# Patient Record
Sex: FEMALE | Race: WHITE | NOT HISPANIC OR LATINO | Employment: OTHER | URBAN - METROPOLITAN AREA
[De-identification: names, ages, dates, MRNs, and addresses within clinical notes are randomized per-mention and may not be internally consistent; named-entity substitution may affect disease eponyms.]

---

## 2017-03-06 ENCOUNTER — TRANSCRIBE ORDERS (OUTPATIENT)
Dept: LAB | Facility: CLINIC | Age: 57
End: 2017-03-06

## 2017-03-06 ENCOUNTER — OFFICE VISIT (OUTPATIENT)
Dept: LAB | Facility: CLINIC | Age: 57
End: 2017-03-06
Payer: COMMERCIAL

## 2017-03-06 ENCOUNTER — APPOINTMENT (OUTPATIENT)
Dept: PREADMISSION TESTING | Facility: HOSPITAL | Age: 57
End: 2017-03-06
Payer: COMMERCIAL

## 2017-03-06 ENCOUNTER — APPOINTMENT (OUTPATIENT)
Dept: LAB | Facility: CLINIC | Age: 57
End: 2017-03-06
Payer: COMMERCIAL

## 2017-03-06 VITALS
HEART RATE: 57 BPM | SYSTOLIC BLOOD PRESSURE: 157 MMHG | HEIGHT: 62 IN | WEIGHT: 160 LBS | TEMPERATURE: 98 F | DIASTOLIC BLOOD PRESSURE: 68 MMHG | RESPIRATION RATE: 16 BRPM | BODY MASS INDEX: 29.44 KG/M2

## 2017-03-06 DIAGNOSIS — C44.529 SQUAMOUS CELL CARCINOMA, TRUNK: ICD-10-CM

## 2017-03-06 DIAGNOSIS — C44.529 SQUAMOUS CELL CARCINOMA, TRUNK: Primary | ICD-10-CM

## 2017-03-06 LAB
ANION GAP SERPL CALCULATED.3IONS-SCNC: 7 MMOL/L (ref 4–13)
ATRIAL RATE: 47 BPM
BASOPHILS # BLD AUTO: 0.04 THOUSANDS/ΜL (ref 0–0.1)
BASOPHILS NFR BLD AUTO: 1 % (ref 0–1)
BUN SERPL-MCNC: 14 MG/DL (ref 5–25)
CALCIUM SERPL-MCNC: 8.8 MG/DL (ref 8.3–10.1)
CHLORIDE SERPL-SCNC: 106 MMOL/L (ref 100–108)
CO2 SERPL-SCNC: 30 MMOL/L (ref 21–32)
CREAT SERPL-MCNC: 0.95 MG/DL (ref 0.6–1.3)
EOSINOPHIL # BLD AUTO: 0.07 THOUSAND/ΜL (ref 0–0.61)
EOSINOPHIL NFR BLD AUTO: 1 % (ref 0–6)
ERYTHROCYTE [DISTWIDTH] IN BLOOD BY AUTOMATED COUNT: 13.4 % (ref 11.6–15.1)
GFR SERPL CREATININE-BSD FRML MDRD: >60 ML/MIN/1.73SQ M
GLUCOSE SERPL-MCNC: 93 MG/DL (ref 65–140)
HCT VFR BLD AUTO: 40.2 % (ref 34.8–46.1)
HGB BLD-MCNC: 13.3 G/DL (ref 11.5–15.4)
LYMPHOCYTES # BLD AUTO: 1.98 THOUSANDS/ΜL (ref 0.6–4.47)
LYMPHOCYTES NFR BLD AUTO: 40 % (ref 14–44)
MCH RBC QN AUTO: 26.8 PG (ref 26.8–34.3)
MCHC RBC AUTO-ENTMCNC: 33.1 G/DL (ref 31.4–37.4)
MCV RBC AUTO: 81 FL (ref 82–98)
MONOCYTES # BLD AUTO: 0.33 THOUSAND/ΜL (ref 0.17–1.22)
MONOCYTES NFR BLD AUTO: 7 % (ref 4–12)
NEUTROPHILS # BLD AUTO: 2.48 THOUSANDS/ΜL (ref 1.85–7.62)
NEUTS SEG NFR BLD AUTO: 51 % (ref 43–75)
P AXIS: 56 DEGREES
PLATELET # BLD AUTO: 214 THOUSANDS/UL (ref 149–390)
PMV BLD AUTO: 10.8 FL (ref 8.9–12.7)
POTASSIUM SERPL-SCNC: 3.7 MMOL/L (ref 3.5–5.3)
PR INTERVAL: 150 MS
QRS AXIS: 35 DEGREES
QRSD INTERVAL: 82 MS
QT INTERVAL: 446 MS
QTC INTERVAL: 394 MS
RBC # BLD AUTO: 4.96 MILLION/UL (ref 3.81–5.12)
SODIUM SERPL-SCNC: 143 MMOL/L (ref 136–145)
T WAVE AXIS: 30 DEGREES
VENTRICULAR RATE: 47 BPM
WBC # BLD AUTO: 4.9 THOUSAND/UL (ref 4.31–10.16)

## 2017-03-06 PROCEDURE — 85025 COMPLETE CBC W/AUTO DIFF WBC: CPT

## 2017-03-06 PROCEDURE — 36415 COLL VENOUS BLD VENIPUNCTURE: CPT

## 2017-03-06 PROCEDURE — 93005 ELECTROCARDIOGRAM TRACING: CPT

## 2017-03-06 PROCEDURE — 80048 BASIC METABOLIC PNL TOTAL CA: CPT

## 2017-03-06 RX ORDER — ESOMEPRAZOLE MAGNESIUM 40 MG/1
40 CAPSULE, DELAYED RELEASE ORAL
COMMUNITY
End: 2021-01-22

## 2017-03-22 RX ORDER — CETIRIZINE HYDROCHLORIDE 10 MG/1
10 TABLET ORAL DAILY PRN
COMMUNITY
End: 2022-03-18 | Stop reason: ALTCHOICE

## 2017-03-23 ENCOUNTER — ANESTHESIA (OUTPATIENT)
Dept: PERIOP | Facility: HOSPITAL | Age: 57
End: 2017-03-23
Payer: COMMERCIAL

## 2017-03-23 ENCOUNTER — ANESTHESIA EVENT (OUTPATIENT)
Dept: PERIOP | Facility: HOSPITAL | Age: 57
End: 2017-03-23
Payer: COMMERCIAL

## 2017-03-23 ENCOUNTER — HOSPITAL ENCOUNTER (OUTPATIENT)
Facility: HOSPITAL | Age: 57
Setting detail: OUTPATIENT SURGERY
Discharge: HOME | End: 2017-03-23
Attending: PLASTIC SURGERY | Admitting: PLASTIC SURGERY
Payer: COMMERCIAL

## 2017-03-23 VITALS
TEMPERATURE: 97.7 F | HEIGHT: 62 IN | SYSTOLIC BLOOD PRESSURE: 141 MMHG | WEIGHT: 155 LBS | DIASTOLIC BLOOD PRESSURE: 67 MMHG | RESPIRATION RATE: 18 BRPM | OXYGEN SATURATION: 97 % | BODY MASS INDEX: 28.52 KG/M2 | HEART RATE: 60 BPM

## 2017-03-23 DIAGNOSIS — C44.529 SQUAMOUS CELL CARCINOMA OF SKIN OF OTHER PART OF TRUNK: ICD-10-CM

## 2017-03-23 PROCEDURE — 88331 PATH CONSLTJ SURG 1 BLK 1SPC: CPT | Performed by: PLASTIC SURGERY

## 2017-03-23 PROCEDURE — 88305 TISSUE EXAM BY PATHOLOGIST: CPT | Performed by: PLASTIC SURGERY

## 2017-03-23 RX ORDER — SODIUM CHLORIDE, SODIUM LACTATE, POTASSIUM CHLORIDE, CALCIUM CHLORIDE 600; 310; 30; 20 MG/100ML; MG/100ML; MG/100ML; MG/100ML
INJECTION, SOLUTION INTRAVENOUS CONTINUOUS PRN
Status: DISCONTINUED | OUTPATIENT
Start: 2017-03-23 | End: 2017-03-23 | Stop reason: SURG

## 2017-03-23 RX ORDER — ONDANSETRON 2 MG/ML
4 INJECTION INTRAMUSCULAR; INTRAVENOUS ONCE AS NEEDED
Status: DISCONTINUED | OUTPATIENT
Start: 2017-03-23 | End: 2017-03-23 | Stop reason: HOSPADM

## 2017-03-23 RX ORDER — LIDOCAINE HYDROCHLORIDE AND EPINEPHRINE 10; 10 MG/ML; UG/ML
INJECTION, SOLUTION INFILTRATION; PERINEURAL AS NEEDED
Status: DISCONTINUED | OUTPATIENT
Start: 2017-03-23 | End: 2017-03-23 | Stop reason: HOSPADM

## 2017-03-23 RX ORDER — PROPOFOL 10 MG/ML
INJECTION, EMULSION INTRAVENOUS AS NEEDED
Status: DISCONTINUED | OUTPATIENT
Start: 2017-03-23 | End: 2017-03-23 | Stop reason: SURG

## 2017-03-23 RX ORDER — PROPOFOL 10 MG/ML
INJECTION, EMULSION INTRAVENOUS CONTINUOUS PRN
Status: DISCONTINUED | OUTPATIENT
Start: 2017-03-23 | End: 2017-03-23 | Stop reason: SURG

## 2017-03-23 RX ORDER — FENTANYL CITRATE/PF 50 MCG/ML
25 SYRINGE (ML) INJECTION
Status: DISCONTINUED | OUTPATIENT
Start: 2017-03-23 | End: 2017-03-23 | Stop reason: HOSPADM

## 2017-03-23 RX ORDER — ALBUTEROL SULFATE 2.5 MG/3ML
2.5 SOLUTION RESPIRATORY (INHALATION) ONCE AS NEEDED
Status: DISCONTINUED | OUTPATIENT
Start: 2017-03-23 | End: 2017-03-23 | Stop reason: HOSPADM

## 2017-03-23 RX ORDER — ONDANSETRON 2 MG/ML
4 INJECTION INTRAMUSCULAR; INTRAVENOUS EVERY 8 HOURS PRN
Status: DISCONTINUED | OUTPATIENT
Start: 2017-03-23 | End: 2017-03-23 | Stop reason: HOSPADM

## 2017-03-23 RX ORDER — MIDAZOLAM HYDROCHLORIDE 1 MG/ML
INJECTION INTRAMUSCULAR; INTRAVENOUS AS NEEDED
Status: DISCONTINUED | OUTPATIENT
Start: 2017-03-23 | End: 2017-03-23 | Stop reason: SURG

## 2017-03-23 RX ORDER — FENTANYL CITRATE 50 UG/ML
INJECTION, SOLUTION INTRAMUSCULAR; INTRAVENOUS AS NEEDED
Status: DISCONTINUED | OUTPATIENT
Start: 2017-03-23 | End: 2017-03-23 | Stop reason: SURG

## 2017-03-23 RX ORDER — ONDANSETRON 2 MG/ML
INJECTION INTRAMUSCULAR; INTRAVENOUS AS NEEDED
Status: DISCONTINUED | OUTPATIENT
Start: 2017-03-23 | End: 2017-03-23 | Stop reason: SURG

## 2017-03-23 RX ORDER — SODIUM CHLORIDE, SODIUM LACTATE, POTASSIUM CHLORIDE, CALCIUM CHLORIDE 600; 310; 30; 20 MG/100ML; MG/100ML; MG/100ML; MG/100ML
125 INJECTION, SOLUTION INTRAVENOUS CONTINUOUS
Status: CANCELLED | OUTPATIENT
Start: 2017-03-23

## 2017-03-23 RX ORDER — MEPERIDINE HYDROCHLORIDE 25 MG/ML
12.5 INJECTION INTRAMUSCULAR; INTRAVENOUS; SUBCUTANEOUS AS NEEDED
Status: DISCONTINUED | OUTPATIENT
Start: 2017-03-23 | End: 2017-03-23 | Stop reason: HOSPADM

## 2017-03-23 RX ORDER — ACETAMINOPHEN 325 MG/1
650 TABLET ORAL EVERY 6 HOURS PRN
Status: DISCONTINUED | OUTPATIENT
Start: 2017-03-23 | End: 2017-03-23 | Stop reason: HOSPADM

## 2017-03-23 RX ADMIN — PROPOFOL 80 MCG/KG/MIN: 10 INJECTION, EMULSION INTRAVENOUS at 15:06

## 2017-03-23 RX ADMIN — PROPOFOL 100 MG: 10 INJECTION, EMULSION INTRAVENOUS at 15:05

## 2017-03-23 RX ADMIN — ONDANSETRON 4 MG: 2 INJECTION INTRAMUSCULAR; INTRAVENOUS at 15:01

## 2017-03-23 RX ADMIN — MIDAZOLAM HYDROCHLORIDE 1 MG: 1 INJECTION, SOLUTION INTRAMUSCULAR; INTRAVENOUS at 15:05

## 2017-03-23 RX ADMIN — CEFAZOLIN SODIUM 1000 MG: 1 SOLUTION INTRAVENOUS at 15:00

## 2017-03-23 RX ADMIN — FENTANYL CITRATE 25 MCG: 50 INJECTION INTRAMUSCULAR; INTRAVENOUS at 15:39

## 2017-03-23 RX ADMIN — FENTANYL CITRATE 25 MCG: 50 INJECTION INTRAMUSCULAR; INTRAVENOUS at 15:01

## 2017-03-23 RX ADMIN — FENTANYL CITRATE 50 MCG: 50 INJECTION INTRAMUSCULAR; INTRAVENOUS at 15:05

## 2017-03-23 RX ADMIN — MIDAZOLAM HYDROCHLORIDE 1 MG: 1 INJECTION, SOLUTION INTRAMUSCULAR; INTRAVENOUS at 15:01

## 2017-03-23 RX ADMIN — SODIUM CHLORIDE, SODIUM LACTATE, POTASSIUM CHLORIDE, AND CALCIUM CHLORIDE: .6; .31; .03; .02 INJECTION, SOLUTION INTRAVENOUS at 14:56

## 2018-06-21 ENCOUNTER — OFFICE VISIT (OUTPATIENT)
Dept: UROLOGY | Facility: CLINIC | Age: 58
End: 2018-06-21
Payer: COMMERCIAL

## 2018-06-21 VITALS
HEIGHT: 62 IN | BODY MASS INDEX: 28.52 KG/M2 | HEART RATE: 53 BPM | SYSTOLIC BLOOD PRESSURE: 122 MMHG | DIASTOLIC BLOOD PRESSURE: 80 MMHG | WEIGHT: 155 LBS

## 2018-06-21 DIAGNOSIS — N39.41 URGE INCONTINENCE OF URINE: Primary | ICD-10-CM

## 2018-06-21 DIAGNOSIS — R35.0 URINARY FREQUENCY: Primary | ICD-10-CM

## 2018-06-21 LAB
POST-VOID RESIDUAL VOLUME, ML POC: 0 ML
SL AMB  POCT GLUCOSE, UA: ABNORMAL
SL AMB LEUKOCYTE ESTERASE,UA: ABNORMAL
SL AMB POCT BILIRUBIN,UA: ABNORMAL
SL AMB POCT BLOOD,UA: ABNORMAL
SL AMB POCT CLARITY,UA: ABNORMAL
SL AMB POCT COLOR,UA: YELLOW
SL AMB POCT KETONES,UA: ABNORMAL
SL AMB POCT NITRITE,UA: ABNORMAL
SL AMB POCT PH,UA: 5
SL AMB POCT SPECIFIC GRAVITY,UA: 1.02
SL AMB POCT URINE PROTEIN: ABNORMAL
SL AMB POCT UROBILINOGEN: ABNORMAL

## 2018-06-21 PROCEDURE — 51798 US URINE CAPACITY MEASURE: CPT | Performed by: UROLOGY

## 2018-06-21 PROCEDURE — 99244 OFF/OP CNSLTJ NEW/EST MOD 40: CPT | Performed by: UROLOGY

## 2018-06-21 PROCEDURE — 81002 URINALYSIS NONAUTO W/O SCOPE: CPT | Performed by: UROLOGY

## 2018-06-21 RX ORDER — OXYBUTYNIN CHLORIDE 5 MG/1
5 TABLET, EXTENDED RELEASE ORAL DAILY
Refills: 6 | COMMUNITY
Start: 2018-06-08 | End: 2019-04-30

## 2018-06-21 NOTE — PROGRESS NOTES
6/21/2018    Belva Cranker  1960  [id number]        Assessment  Urinary frequency and urgency    Plan  We discussed her symptoms  She has multiple symptoms of overactive bladder  We discussed behavior modification as first-line therapy  This apparently was not discussed with her at her previous urology office  Recommend significantly increase her water intake throughout the day and decreasing her tea  I suggest no longer drinking any tea, and drinking her usual cup of coffee in the morning as well as water the rest of the day  She may also resume the Myrbetriq if she likes  Typically this does not cause constipation although it is possible  She would like to try it, now that she understands had to treat the constipation if necessary  We will also check a follow-up ultrasound of the urinary tract including postvoid residual next month to evaluate for stability any effect of the treatment  She is comfortable with this plan  All questions answered to her satisfaction  History of Present Illness  Long Cousin is a 61 y o  female referred for evaluation of lower urinary tract symptoms  She complains of urinary frequency and urgency throughout the day  She also has nocturia 2 to 3 times per night  This is become very bothersome  She was treated by [facility] physician assistant for these symptoms  She has never seen a physician there  Initially she was treated with oxybutynin and did not notice any improvement  She was then treated with Myrbetriq and developed severe constipation requiring an emergency room visit  Eventually she was able to resolve, but discontinue the medication  She is now taking oxybutynin 5 mg without relief  She denies any additional workup for this  She believes that symptoms began with a urinary tract infection about 1 to 2 years ago  And review of urine studies I do not see any positive urine cultures  She reports normal gyn evaluations routinely    She has never had pelvic surgery  She is not a smoker  She had a normal sleep study to rule out sleep apnea  Typically, she drinks coffee in the morning and then decaffeinated tea throughout the day with about 1 to 2 bottles of water  At night she does not drink fluid  Bladder scan postvoid residual 0 mL in the office today  Review of Systems  Review of Systems   Constitutional: Negative  HENT: Negative  Respiratory: Negative  Cardiovascular: Negative  Gastrointestinal: Negative  Genitourinary:        As per HPI   Musculoskeletal: Negative  Skin: Negative  Neurological: Negative  Hematological: Negative  Past Medical History  Past Medical History:   Diagnosis Date    GERD (gastroesophageal reflux disease)     Melanoma ([facility] [address] )     Hx       Past Social History  Past Surgical History:   Procedure Laterality Date    CHEST WALL BIOPSY Right 3/23/2017    Procedure: BREAST SCC EXCISION; LOCAL FLAP; FROZEN SECTION ;  Surgeon: Shimon Cai MD;  [facility];  Service:     FOOT SURGERY Left     x 3       Past Family History  History reviewed  No pertinent family history  Past Social history  Social History     Social History    Marital status: /Civil Union     Spouse name: N/A    Number of children: N/A    Years of education: N/A     Occupational History    Not on file       Social History Main Topics    Smoking status: Never Smoker    Smokeless tobacco: Not on file    Alcohol use No    Drug use: No    Sexual activity: Not on file     Other Topics Concern    Not on file     Social History Narrative    No narrative on file     History   Smoking Status    Never Smoker   Smokeless Tobacco    Not on file       Current Medications  Current Outpatient Prescriptions   Medication Sig Dispense Refill    cetirizine (ZyrTEC) 10 mg tablet Take 10 mg by mouth daily      esomeprazole (NexIUM) 40 MG capsule Take 40 mg by mouth every morning before breakfast      oxybutynin (DITROPAN-XL) 5 mg 24 hr tablet Take 5 mg by mouth daily  6     No current facility-administered medications for this visit  Allergies  Allergies   Allergen Reactions    Codeine      "wired up"       Past Medical History, Social History, Family History, medications and allergies were reviewed  Vitals  Vitals:    06/21/18 1055   BP: 122/80   Pulse: (!) 53   Weight: 70 3 kg (155 lb)   Height: 5' 2" (1 575 m)       Physical Exam  Physical Exam   Constitutional: She is oriented to person, place, and time  She appears well-developed and well-nourished  Cardiovascular: Normal rate  Pulmonary/Chest: Effort normal    Abdominal: Soft  Genitourinary:   Genitourinary Comments: No CVA tenderness   Musculoskeletal: Normal range of motion  Neurological: She is alert and oriented to person, place, and time  Skin: Skin is warm, dry and intact  Psychiatric: She has a normal mood and affect  Vitals reviewed          Results  No results found for: PSA  Lab Results   Component Value Date    GLUCOSE 93 03/06/2017    CALCIUM 8 8 03/06/2017     03/06/2017    K 3 7 03/06/2017    CO2 30 03/06/2017     03/06/2017    BUN 14 03/06/2017    CREATININE 0 95 03/06/2017     Lab Results   Component Value Date    WBC 4 90 03/06/2017    HGB 13 3 03/06/2017    HCT 40 2 03/06/2017    MCV 81 (L) 03/06/2017     03/06/2017

## 2018-06-21 NOTE — LETTER
June 21, 2018     Suresh Love, [address] [address]    Patient: Miguelangel Calle   YOB: 1960   Date of Visit: 6/21/2018       Dear Dr Charley Benito: Thank you for referring Amanda Jiménez to me for evaluation  Below are my notes for this consultation  If you have questions, please do not hesitate to call me  I look forward to following your patient along with you  Sincerely,        Komal Whiting MD        CC: No Recipients  Komal Whiting MD  6/21/2018 11:41 AM  Sign at close encounter  6/21/2018    Miguelangel Calle  1960  [id number]        Assessment  Urinary frequency and urgency    Plan  We discussed her symptoms  She has multiple symptoms of overactive bladder  We discussed behavior modification as first-line therapy  This apparently was not discussed with her at her previous urology office  Recommend significantly increase her water intake throughout the day and decreasing her tea  I suggest no longer drinking any tea, and drinking her usual cup of coffee in the morning as well as water the rest of the day  She may also resume the Myrbetriq if she likes  Typically this does not cause constipation although it is possible  She would like to try it, now that she understands had to treat the constipation if necessary  We will also check a follow-up ultrasound of the urinary tract including postvoid residual next month to evaluate for stability any effect of the treatment  She is comfortable with this plan  All questions answered to her satisfaction  History of Present Illness  Epi Thomsa is a 61 y o  female referred for evaluation of lower urinary tract symptoms  She complains of urinary frequency and urgency throughout the day  She also has nocturia 2 to 3 times per night  This is become very bothersome  She was treated by [facility] physician assistant for these symptoms  She has never seen a physician there    Initially she was treated with oxybutynin and did not notice any improvement  She was then treated with Myrbetriq and developed severe constipation requiring an emergency room visit  Eventually she was able to resolve, but discontinue the medication  She is now taking oxybutynin 5 mg without relief  She denies any additional workup for this  She believes that symptoms began with a urinary tract infection about 1 to 2 years ago  And review of urine studies I do not see any positive urine cultures  She reports normal gyn evaluations routinely  She has never had pelvic surgery  She is not a smoker  She had a normal sleep study to rule out sleep apnea  Typically, she drinks coffee in the morning and then decaffeinated tea throughout the day with about 1 to 2 bottles of water  At night she does not drink fluid  Bladder scan postvoid residual 0 mL in the office today  Review of Systems  Review of Systems   Constitutional: Negative  HENT: Negative  Respiratory: Negative  Cardiovascular: Negative  Gastrointestinal: Negative  Genitourinary:        As per HPI   Musculoskeletal: Negative  Skin: Negative  Neurological: Negative  Hematological: Negative  Past Medical History  Past Medical History:   Diagnosis Date    GERD (gastroesophageal reflux disease)     Melanoma ([facility] [address] )     Hx       Past Social History  Past Surgical History:   Procedure Laterality Date    CHEST WALL BIOPSY Right 3/23/2017    Procedure: BREAST SCC EXCISION; LOCAL FLAP; FROZEN SECTION ;  Surgeon: Shimon Cai MD;  [facility];  Service:     FOOT SURGERY Left     x 3       Past Family History  History reviewed  No pertinent family history  Past Social history  Social History     Social History    Marital status: /Civil Union     Spouse name: N/A    Number of children: N/A    Years of education: N/A     Occupational History    Not on file       Social History Main Topics    Smoking status: Never Smoker  Smokeless tobacco: Not on file    Alcohol use No    Drug use: No    Sexual activity: Not on file     Other Topics Concern    Not on file     Social History Narrative    No narrative on file     History   Smoking Status    Never Smoker   Smokeless Tobacco    Not on file       Current Medications  Current Outpatient Prescriptions   Medication Sig Dispense Refill    cetirizine (ZyrTEC) 10 mg tablet Take 10 mg by mouth daily      esomeprazole (NexIUM) 40 MG capsule Take 40 mg by mouth every morning before breakfast      oxybutynin (DITROPAN-XL) 5 mg 24 hr tablet Take 5 mg by mouth daily  6     No current facility-administered medications for this visit  Allergies  Allergies   Allergen Reactions    Codeine      "wired up"       Past Medical History, Social History, Family History, medications and allergies were reviewed  Vitals  Vitals:    06/21/18 1055   BP: 122/80   Pulse: (!) 53   Weight: 70 3 kg (155 lb)   Height: 5' 2" (1 575 m)       Physical Exam  Physical Exam   Constitutional: She is oriented to person, place, and time  She appears well-developed and well-nourished  Cardiovascular: Normal rate  Pulmonary/Chest: Effort normal    Abdominal: Soft  Genitourinary:   Genitourinary Comments: No CVA tenderness   Musculoskeletal: Normal range of motion  Neurological: She is alert and oriented to person, place, and time  Skin: Skin is warm, dry and intact  Psychiatric: She has a normal mood and affect  Vitals reviewed          Results  No results found for: PSA  Lab Results   Component Value Date    GLUCOSE 93 03/06/2017    CALCIUM 8 8 03/06/2017     03/06/2017    K 3 7 03/06/2017    CO2 30 03/06/2017     03/06/2017    BUN 14 03/06/2017    CREATININE 0 95 03/06/2017     Lab Results   Component Value Date    WBC 4 90 03/06/2017    HGB 13 3 03/06/2017    HCT 40 2 03/06/2017    MCV 81 (L) 03/06/2017     03/06/2017

## 2018-06-21 NOTE — TELEPHONE ENCOUNTER
Patient seen as consult today with Dr Paul London in [address] [address] office  Per patient, Dr Paul London, wanted her to go back on Myrbetriq  Patient reports her previous urologist had her on 25 mg Myrbetriq, but had taken her off and placed her on Oxybutynin  Patient reports she went today to get Myrbetriq script refilled and was told that previous script   Patient requesting script for Myrbetriq 25 mg/90 day be sent to [facility]  Patient reports previously, she needed to get authorization for Myrbetriq to be covered  Instructed patient that script will be sent, if authorization needed, she should call our office

## 2018-06-21 NOTE — TELEPHONE ENCOUNTER
Patient called office  Reports when she got home, she looked an on old prescription bottle, discovered she has been taking 50 mg Myrbetriq  Script changed to 50 mg Myrbetriq  To call pharmacy with change

## 2018-06-27 DIAGNOSIS — N39.41 URGE INCONTINENCE OF URINE: ICD-10-CM

## 2018-06-28 ENCOUNTER — HOSPITAL ENCOUNTER (OUTPATIENT)
Dept: ULTRASOUND IMAGING | Facility: MEDICAL CENTER | Age: 58
Discharge: HOME | End: 2018-06-28
Payer: COMMERCIAL

## 2018-06-28 DIAGNOSIS — R35.0 URINARY FREQUENCY: ICD-10-CM

## 2018-06-28 PROCEDURE — 51798 US URINE CAPACITY MEASURE: CPT

## 2018-07-25 NOTE — PROGRESS NOTES
7/26/2018      Chief Complaint   Patient presents with    Urinary Frequency       Assessment and Plan    61 y o  female managed by Dr Susan Harden    1  OAB  - doing well on Myrbetriq, will continue this and behavioral modification and FU in 1 year  - provided the patient a handout on OAB from the [facility] patient education handout for her to review      History of Present Illness  Carlie Small is a 61 y o  female here for follow up evaluation of OAB  Failed oxybutynin and previously while on Myrbetriq had a SBO so she was discontinued on this  Has had no issues with constipation since restarting Myrbetriq  Is doing well with improvement in her OAB symptoms  They are listed as below  U/S obtained prior to visit is within normal limits  Review of Systems   Constitutional: Negative for activity change, chills and fever  Gastrointestinal: Negative for abdominal distention and abdominal pain  Musculoskeletal: Negative for back pain and gait problem  Psychiatric/Behavioral: Negative for behavioral problems and confusion  Urinary Incontinence Screening      Most Recent Value   Urinary Incontinence   Urinary Incontinence? No   Incomplete emptying? No   Urinary frequency? No   Urinary urgency? No   Urinary hesitancy? No   Dysuria (painful difficult urination)? No   Nocturia (waking up to use the bathroom)? Yes [1-2 times per night]   Straining (having to push to go)? No   Weak stream?  No   Intermittent stream?  No   Post void dribbling?   No          Past Medical History  Past Medical History:   Diagnosis Date    GERD (gastroesophageal reflux disease)     Melanoma ([facility] [address] )     Hx       Past Social History  Past Surgical History:   Procedure Laterality Date    CHEST WALL BIOPSY Right 3/23/2017    Procedure: BREAST SCC EXCISION; LOCAL FLAP; FROZEN SECTION ;  Surgeon: Shawna Anthony MD;  [facility];  Service:     FOOT SURGERY Left     x 3     History   Smoking Status    Never Smoker   Smokeless Tobacco    Not on file       Past Family History  History reviewed  No pertinent family history  Past Social history  Social History     Social History    Marital status: /Civil Union     Spouse name: N/A    Number of children: N/A    Years of education: N/A     Occupational History    Not on file  Social History Main Topics    Smoking status: Never Smoker    Smokeless tobacco: Not on file    Alcohol use No    Drug use: No    Sexual activity: Not on file     Other Topics Concern    Not on file     Social History Narrative    No narrative on file       Current Medications  Current Outpatient Prescriptions   Medication Sig Dispense Refill    cetirizine (ZyrTEC) 10 mg tablet Take 10 mg by mouth daily      esomeprazole (NexIUM) 40 MG capsule Take 40 mg by mouth every morning before breakfast      Mirabegron ER (MYRBETRIQ) 50 MG TB24 Take 1 tablet (50 mg total) by mouth daily 90 tablet 3    oxybutynin (DITROPAN-XL) 5 mg 24 hr tablet Take 5 mg by mouth daily  6     No current facility-administered medications for this visit  Allergies  Allergies   Allergen Reactions    Codeine      "wired up"         The following portions of the patient's history were reviewed and updated as appropriate: allergies, current medications, past medical history, past social history, past surgical history and problem list       Vitals  Vitals:    07/26/18 1124   BP: 124/80   Weight: 73 kg (161 lb)   Height: 5' 2" (1 575 m)       Physical Exam  Constitutional   General appearance: Patient is seated and in no acute distress, well appearing and well nourished  Head and Face   Head and face: Normal     Eyes   Conjunctiva and lids: No erythema, swelling or discharge  Ears, Nose, Mouth, and Throat   Hearing: Normal     Pulmonary   Respiratory effort: No increased work of breathing or signs of respiratory distress      Cardiovascular   Examination of extremities for edema and/or varicosities: Normal  Abdomen   Abdomen: Non-tender, no masses  Musculoskeletal   Gait and station: Normal     Skin   Skin and subcutaneous tissue: Warm, dry, and intact  No visible lesions or rashes  Psychiatric   Judgment and insight: Normal  Recent and remote memory:  Normal  Mood and affect: Normal      Results  No results found for this or any previous visit (from the past 1 hour(s))  ]  No results found for: PSA  Lab Results   Component Value Date    GLUCOSE 93 03/06/2017    CALCIUM 8 8 03/06/2017     03/06/2017    K 3 7 03/06/2017    CO2 30 03/06/2017     03/06/2017    BUN 14 03/06/2017    CREATININE 0 95 03/06/2017     Lab Results   Component Value Date    WBC 4 90 03/06/2017    HGB 13 3 03/06/2017    HCT 40 2 03/06/2017    MCV 81 (L) 03/06/2017     03/06/2017       Orders  No orders of the defined types were placed in this encounter

## 2018-07-26 ENCOUNTER — OFFICE VISIT (OUTPATIENT)
Dept: UROLOGY | Facility: CLINIC | Age: 58
End: 2018-07-26
Payer: COMMERCIAL

## 2018-07-26 VITALS
DIASTOLIC BLOOD PRESSURE: 80 MMHG | SYSTOLIC BLOOD PRESSURE: 124 MMHG | HEIGHT: 62 IN | WEIGHT: 161 LBS | BODY MASS INDEX: 29.63 KG/M2

## 2018-07-26 DIAGNOSIS — R35.0 URINARY FREQUENCY: Primary | ICD-10-CM

## 2018-07-26 PROCEDURE — 99213 OFFICE O/P EST LOW 20 MIN: CPT | Performed by: PHYSICIAN ASSISTANT

## 2018-09-06 ENCOUNTER — OFFICE VISIT (OUTPATIENT)
Dept: OBGYN CLINIC | Facility: MEDICAL CENTER | Age: 58
End: 2018-09-06
Payer: COMMERCIAL

## 2018-09-06 ENCOUNTER — APPOINTMENT (OUTPATIENT)
Dept: RADIOLOGY | Facility: CLINIC | Age: 58
End: 2018-09-06
Payer: COMMERCIAL

## 2018-09-06 VITALS
HEIGHT: 62 IN | DIASTOLIC BLOOD PRESSURE: 84 MMHG | HEART RATE: 60 BPM | BODY MASS INDEX: 29.85 KG/M2 | SYSTOLIC BLOOD PRESSURE: 130 MMHG | WEIGHT: 162.2 LBS

## 2018-09-06 DIAGNOSIS — M25.551 PAIN IN RIGHT HIP: Primary | ICD-10-CM

## 2018-09-06 DIAGNOSIS — M25.551 PAIN IN RIGHT HIP: ICD-10-CM

## 2018-09-06 PROCEDURE — 99203 OFFICE O/P NEW LOW 30 MIN: CPT | Performed by: ORTHOPAEDIC SURGERY

## 2018-09-06 PROCEDURE — 73502 X-RAY EXAM HIP UNI 2-3 VIEWS: CPT

## 2018-09-06 NOTE — PROGRESS NOTES
Assessment/Plan     1  Pain in right hip      Orders Placed This Encounter   Procedures    XR hip/pelv 2-3 vws right if performed    MRI hip right wo contrast     Ms  Shira Patel has persistent right hip pain  She has a history of melanoma  We will obtain an MRI  If there is anything suspicious we will obtain an MRI with IV contrast   She did not want an MRI arthrogram   She did not want an MRI with IV contrast at this time  OTC pain medication as needed    Return for Follow-up after MRI completed  I answered all of the patient's questions during the visit and provided education of the patient's condition during the visit  The patient verbalized understanding of the information given and agrees with the plan  This note was dictated using [employer] software  It may contain errors including improperly dictated words  Please contact physician directly for any questions  History of Present Illness   Chief complaint:   Chief Complaint   Patient presents with    Right Hip - Pain       HPI: Montse Dwyer is a 61 y o  female that c/o right hip pain  She has had pain for the past 4-5 months  Most her pain is over the anterolateral aspect of her right hip  Is constant  It worsens with going from a sitting to standing position  She describes as sharp with movement  She is unable to sleep on her right side  She denies any falls or trauma  She denies radiation of her pain  She notes that she had a previous episode of similar pain about 1 2 years ago  She saw an orthopedic surgeon who gave her medication  She is unsure how much this helped  She takes Advil when she needs it which gives her minimal relief  She has done physical therapy  She has not had injections and has not had surgery  ROS:    See HPI for musculoskeletal review     All other systems reviewed are negative     Historical Information   Past Medical History:   Diagnosis Date    GERD (gastroesophageal reflux disease)     Melanoma ([facility] [address] ) Hx     Past Surgical History:   Procedure Laterality Date    CHEST WALL BIOPSY Right 3/23/2017    Procedure: BREAST SCC EXCISION; LOCAL FLAP; FROZEN SECTION ;  Surgeon: Remi Sandoval MD;  [facility];  Service:     FOOT SURGERY Left     x 3     Social History   History   Alcohol Use No     History   Drug Use No     History   Smoking Status    Never Smoker   Smokeless Tobacco    Never Used     Family History: No family history on file  Meds/Allergies     (Not in a hospital admission)  Allergies   Allergen Reactions    Codeine      "wired up"       Objective   Vitals: Blood pressure 130/84, pulse 60, height 5' 2" (1 575 m), weight 73 6 kg (162 lb 3 2 oz)  ,Body mass index is 29 67 kg/m²  PE:  AAOx 3  WDWN  Hearing intact, no drainage from eyes  Regular rate  no audible wheezing  no abdominal distension  LE compartments soft, skin intact    right hip:   No dislocation/deformity  ROM: full  mild TTP over greater trochanter  No TTP over SIJ  + Madi test  Neg   Ron's test  Abduction 5/5  AT/GS intact    Back:    No TTP over lumbar spinous processes, paraspinal musculature  Negative SLR    Imaging Studies: I have personally reviewed pertinent films in PACS   X-ray right hip:  No acute osseous abnormality

## 2018-09-19 ENCOUNTER — HOSPITAL ENCOUNTER (OUTPATIENT)
Dept: MRI IMAGING | Facility: HOSPITAL | Age: 58
Discharge: HOME | End: 2018-09-19
Attending: ORTHOPAEDIC SURGERY
Payer: COMMERCIAL

## 2018-09-19 DIAGNOSIS — M25.551 PAIN IN RIGHT HIP: ICD-10-CM

## 2018-09-19 PROCEDURE — 73721 MRI JNT OF LWR EXTRE W/O DYE: CPT

## 2018-09-25 ENCOUNTER — OFFICE VISIT (OUTPATIENT)
Dept: OBGYN CLINIC | Facility: MEDICAL CENTER | Age: 58
End: 2018-09-25
Payer: COMMERCIAL

## 2018-09-25 VITALS
HEART RATE: 70 BPM | BODY MASS INDEX: 29.74 KG/M2 | SYSTOLIC BLOOD PRESSURE: 119 MMHG | DIASTOLIC BLOOD PRESSURE: 77 MMHG | WEIGHT: 162.6 LBS

## 2018-09-25 DIAGNOSIS — M76.891 HAMSTRING TENDINITIS OF RIGHT THIGH: ICD-10-CM

## 2018-09-25 DIAGNOSIS — M70.61 GREATER TROCHANTERIC BURSITIS OF RIGHT HIP: Primary | ICD-10-CM

## 2018-09-25 PROCEDURE — 99213 OFFICE O/P EST LOW 20 MIN: CPT | Performed by: ORTHOPAEDIC SURGERY

## 2018-09-25 NOTE — PROGRESS NOTES
Assessment/Plan     1  Greater trochanteric bursitis of right hip    2  Hamstring tendinitis of right thigh      Orders Placed This Encounter   Procedures    Ambulatory referral to Physical Therapy     -will go to one session of PT then transition to home exercises  -continue with OTC NSAIDs and tylenol    Return 4-6 weeks   Subjective   Chief Complaint:   Chief Complaint   Patient presents with    Right Hip - Follow-up       Montse Dwyer is a 60 y o  female who presents for follow up for right hip pain after MRI  She states she is still having pain in her lateral hip  The pain does not radiate  The pain is there all the time  She cannot lay on that side at night  She has been taking Advil and Tylenol as needed for pain control  The Advil does help somewhat  She has not done any physical therapy  She has not had any steroid injections  She has had no surgeries on the hip  Review of Systems  See HPI for musculoskeletal review  All other systems reviewed are negative     History:  Past Medical History:   Diagnosis Date    GERD (gastroesophageal reflux disease)     [facility] ([facility] [address] )     Hx     Past Surgical History:   Procedure Laterality Date    CHEST WALL BIOPSY Right 3/23/2017    Procedure: BREAST SCC EXCISION; LOCAL FLAP; FROZEN SECTION ;  Surgeon: Swapna Sanchez MD;  [facility];  Service:     FOOT SURGERY Left     x 3     Social History   History   Alcohol Use No     History   Drug Use No     History   Smoking Status    Never Smoker   Smokeless Tobacco    Never Used     Family History: History reviewed  No pertinent family history      Meds/Allergies     (Not in a hospital admission)  Allergies   Allergen Reactions    Codeine      "wired up"    Prochlorperazine Other (See Comments)     Unknown to patient          Objective     /77   Pulse 70   Wt 73 8 kg (162 lb 9 6 oz)   BMI 29 74 kg/m²      PE:  AAOx 3  WDWN  Hearing intact, no drainage from eyes  no audible wheezing  no abdominal distension  LE compartments soft, skin intact    Ortho Exam:  right hip:   No dislocation/deformity  ROM: full  No TTP over greater trochanter  No TTP over SIJ  Neg  Madi test  Neg  Ron's test  Abduction 5/5  AT/GS intact    Imaging Studies: I have personally reviewed pertinent films in PACS  MRI- no lesions, mild bilateral trochanteric bursitis, mild hamstring tendinosis

## 2018-10-04 ENCOUNTER — HOSPITAL ENCOUNTER (EMERGENCY)
Facility: HOSPITAL | Age: 58
Discharge: HOME | End: 2018-10-04
Attending: EMERGENCY MEDICINE
Payer: COMMERCIAL

## 2018-10-04 VITALS
RESPIRATION RATE: 16 BRPM | SYSTOLIC BLOOD PRESSURE: 193 MMHG | BODY MASS INDEX: 29.76 KG/M2 | HEART RATE: 95 BPM | WEIGHT: 162.7 LBS | OXYGEN SATURATION: 96 % | DIASTOLIC BLOOD PRESSURE: 87 MMHG | TEMPERATURE: 98.9 F

## 2018-10-04 DIAGNOSIS — K59.00 CONSTIPATION: Primary | ICD-10-CM

## 2018-10-04 PROCEDURE — 99283 EMERGENCY DEPT VISIT LOW MDM: CPT

## 2018-10-04 RX ORDER — DOCUSATE SODIUM 100 MG/1
100 CAPSULE, LIQUID FILLED ORAL EVERY 12 HOURS
Qty: 60 CAPSULE | Refills: 0 | Status: SHIPPED | OUTPATIENT
Start: 2018-10-04 | End: 2022-03-18 | Stop reason: ALTCHOICE

## 2018-10-04 RX ORDER — LORAZEPAM 1 MG/1
1 TABLET ORAL EVERY 8 HOURS PRN
COMMUNITY
Start: 2018-09-24

## 2018-10-04 NOTE — ED PROVIDER NOTES
History  Chief Complaint   Patient presents with    Fecal Impaction     pt c/o constipation for a week  pt has used otc meds no bm  called pcp and kub done which showed an impaction  pcp reffered patient to ED  pt c/o nausea, and abd pain pt did enema this am       34-year-old female presents for evaluation of constipation  Patient states she last normally moved her bowels on Sunday  She states that since then she has had several small loose bowel movements but not initiate rate is normal  She states she has tried an enema, laxatives without relief  Patient has a history of similar symptoms in the past   Patient had Abdominal obstruction series reading from earlier today was reviewed  It rates moderate amount of feces in the colon rectum  There is no findings/obstructive  Patient denies abdominal pain, back/flank pain, rectal pain, anorexia, nausea vomiting fevers, chills  History provided by:  Patient      Prior to Admission Medications   Prescriptions Last Dose Informant Patient Reported? Taking? LORazepam (ATIVAN) 1 mg tablet   Yes Yes   Sig: Take 1 mg by mouth every 8 (eight) hours   Mirabegron ER (MYRBETRIQ) 50 MG TB24   No No   Sig: Take 1 tablet (50 mg total) by mouth daily   cetirizine (ZyrTEC) 10 mg tablet   Yes No   Sig: Take 10 mg by mouth daily   esomeprazole (NexIUM) 40 MG capsule   Yes No   Sig: Take 40 mg by mouth every morning before breakfast   oxybutynin (DITROPAN-XL) 5 mg 24 hr tablet   Yes No   Sig: Take 5 mg by mouth daily      Facility-Administered Medications: None       Past Medical History:   Diagnosis Date    GERD (gastroesophageal reflux disease)     Melanoma ([address] )     Hx       Past Surgical History:   Procedure Laterality Date    CHEST WALL BIOPSY Right 3/23/2017    Procedure: BREAST SCC EXCISION; LOCAL FLAP; FROZEN SECTION ;  Surgeon: Christ Zarate MD;  [facility];  Service:     FOOT SURGERY Left     x 3       History reviewed  No pertinent family history    I have reviewed and agree with the history as documented  Social History   Substance Use Topics    Smoking status: Never Smoker    Smokeless tobacco: Never Used    Alcohol use No        Review of Systems   Constitutional: Negative for activity change, appetite change, fatigue and fever  HENT: Negative for congestion, dental problem, ear pain, rhinorrhea and sore throat  Eyes: Negative for pain and redness  Respiratory: Negative for chest tightness, shortness of breath and wheezing  Cardiovascular: Negative for chest pain and palpitations  Gastrointestinal: Positive for constipation  Negative for abdominal distention, abdominal pain, blood in stool, diarrhea, nausea, rectal pain and vomiting  Endocrine: Negative for cold intolerance and heat intolerance  Genitourinary: Negative for dysuria, frequency, hematuria, vaginal bleeding and vaginal discharge  Musculoskeletal: Negative for arthralgias and myalgias  Skin: Negative for color change, pallor and rash  Neurological: Negative for weakness and numbness  Hematological: Does not bruise/bleed easily  Psychiatric/Behavioral: Negative for agitation, hallucinations and suicidal ideas  Physical Exam  Physical Exam   Constitutional: She is oriented to person, place, and time  She appears well-developed and well-nourished  HENT:   Mouth/Throat: No oropharyngeal exudate  TMs normal bilaterally no pharyngeal erythema no rhinorrhea nontender palpation of sinuses, normal looking turbinates   Eyes: Conjunctivae and EOM are normal    Neck: Normal range of motion  Neck supple  No meningeal signs   Cardiovascular: Normal rate, regular rhythm, normal heart sounds and intact distal pulses  Pulmonary/Chest: Effort normal and breath sounds normal  No respiratory distress  She has no wheezes  She has no rales  She exhibits no tenderness  Abdominal: Soft  Bowel sounds are normal  She exhibits no distension and no mass  There is no tenderness  No hernia  No cvat   Musculoskeletal: Normal range of motion  She exhibits no edema  Lymphadenopathy:     She has no cervical adenopathy  Neurological: She is alert and oriented to person, place, and time  No cranial nerve deficit  Skin: No rash noted  No erythema  No edema   Psychiatric: She has a normal mood and affect  Her behavior is normal    Nursing note and vitals reviewed  Vital Signs  ED Triage Vitals [10/04/18 1142]   Temperature Pulse Respirations Blood Pressure SpO2   98 9 °F (37 2 °C) 95 16 (!) 193/87 96 %      Temp Source Heart Rate Source Patient Position - Orthostatic VS BP Location FiO2 (%)   Oral Monitor Sitting Right arm --      Pain Score       No Pain           Vitals:    10/04/18 1142   BP: (!) 193/87   Pulse: 95   Patient Position - Orthostatic VS: Sitting       Visual Acuity      ED Medications  Medications - No data to display    Diagnostic Studies  Results Reviewed     None                 No orders to display              Procedures  Procedures       Phone Contacts  ED Phone Contact    ED Course  ED Course as of Oct 04 1423   Thu Oct 04, 2018   1412 Patient feels relieved after successful bowel movement  Patient will be discharged home on stool softeners, PCP follow-up  MDM  Number of Diagnoses or Management Options  Diagnosis management comments: Constipation-will do enema, reassess    CritCare Time    Disposition  Final diagnoses:   Constipation     Time reflects when diagnosis was documented in both MDM as applicable and the Disposition within this note     Time User Action Codes Description Comment    10/4/2018  2:21 PM Ansley Mejía Add [K59 00] Constipation       ED Disposition     ED Disposition Condition Comment    Discharge  Venkata Sandy discharge to home/self care      Condition at discharge: Good        Follow-up Information     Follow up With Specialties Details Why Contact Info    Monica Cruz MD Family Medicine Schedule an appointment as soon as possible for a visit in 2 days  [address] [place] [address]  [phone]            Patient's Medications   Discharge Prescriptions    DOCUSATE SODIUM (COLACE) 100 MG CAPSULE    Take 1 capsule (100 mg total) by mouth every 12 (twelve) hours       Start Date: 10/4/2018 End Date: --       Order Dose: 100 mg       Quantity: 60 capsule    Refills: 0     No discharge procedures on file      ED Provider  Electronically Signed by           Shelbie Bhatti MD  10/04/18 0080

## 2018-10-04 NOTE — DISCHARGE INSTRUCTIONS
Constipation   WHAT YOU NEED TO KNOW:   Constipation is when you have hard, dry bowel movements, or you go longer than usual between bowel movements  DISCHARGE INSTRUCTIONS:   Seek care immediately if:   · You have blood in your bowel movements  · You have a fever and abdominal pain with the constipation  Contact your healthcare provider if:   · Your constipation gets worse  · You start to vomit  · You have questions or concerns about your condition or care  Medicines:   · Medicine or a fiber supplement  may help make your bowel movement softer  A laxative may help relax and loosen your intestines to help you have a bowel movement  You may also be given medicine to increase fluid in your intestines  The fluid may help move bowel movements through your intestines  · Take your medicine as directed  Contact your healthcare provider if you think your medicine is not helping or if you have side effects  Tell him of her if you are allergic to any medicine  Keep a list of the medicines, vitamins, and herbs you take  Include the amounts, and when and why you take them  Bring the list or the pill bottles to follow-up visits  Carry your medicine list with you in case of an emergency  Manage your constipation:   · Drink liquids as directed  You may need to drink extra liquids to help soften and move your bowels  Ask how much liquid to drink each day and which liquids are best for you  · Eat high-fiber foods  This may help decrease constipation by adding bulk to your bowel movements  High-fiber foods include fruit, vegetables, whole-grain breads and cereals, and beans  Your healthcare provider or dietitian can help you create a high-fiber meal plan  · Exercise regularly  Regular physical activity can help stimulate your intestines  Ask which exercises are best for you  · Schedule a time each day to have a bowel movement  This may help train your body to have regular bowel movements   [facility] forward while you are on the toilet to help move the bowel movement out  Sit on the toilet for at least 10 minutes, even if you do not have a bowel movement  Follow up with your healthcare provider as directed:  Write down your questions so you remember to ask them during your visits  © 2017 [facility] Information is for End User's use only and may not be sold, redistributed or otherwise used for commercial purposes  All illustrations and images included in CareNotes® are the copyrighted property of [employer]  or Cosmo Alvarado  The above information is an  only  It is not intended as medical advice for individual conditions or treatments  Talk to your doctor, nurse or pharmacist before following any medical regimen to see if it is safe and effective for you

## 2018-10-17 ENCOUNTER — TELEPHONE (OUTPATIENT)
Dept: UROLOGY | Facility: CLINIC | Age: 58
End: 2018-10-17

## 2018-10-17 NOTE — TELEPHONE ENCOUNTER
Patient managed by Dr Alejandra Medina, seen in [facility] End office  Patient last seen in July 2018 by Guera Hudson PA-C  Patient currently taking Myrbetriq for OAB  Per patient, symptoms are not improved  Patient reports "accupuncture treatment" was briefly mentioned at her last appointment  Instructed patient, she might be referring to PTNS treatments  Briefly explained treatments to patient  Advised patient, she should schedule follow up to discuss PTNS and also any other treatments that she may be a candidate for to treat her symptoms  Patient agreed  Patient scheduled for 10/25/18 at 1:30 in the [facility] office with Maciej Garza

## 2018-12-05 NOTE — PROGRESS NOTES
12/7/2018      Chief Complaint   Patient presents with    Urinary Frequency       Assessment and Plan    60 y o  female managed by Dr Jarrett Fernandez    1  OAB  - given her constiaption anticholenergics not recommended  - discussed tertiary treatment options with the patient including pelvic floor physical therapy, InterStim, PTNS, and Botox injections  - patient is most interested in neuromodulation specifically PTNS, provided her information for further review but will schedule this in the meantime       History of Present Illness  Erasmo Rasheed is a 60 y o  female here for follow up evaluation of her overactive bladder  Was previously doing well on Myrbetriq but states that this has caused her significant constipation and she recently had a bowel obstruction  Her lower urinary tract symptoms are listed as below  Most bothersome is her frequency and nocturia  She is urinating about once every hour and wakes up 3 times at night to go  She has tried behavioral modification with no success  Review of Systems   Constitutional: Negative for activity change, chills and fever  Gastrointestinal: Negative for abdominal distention and abdominal pain  Musculoskeletal: Negative for back pain and gait problem  Psychiatric/Behavioral: Negative for behavioral problems and confusion  Urinary Incontinence Screening      Most Recent Value   Urinary Incontinence   Urinary Incontinence? No   Incomplete emptying? No   Urinary frequency? Yes   Urinary urgency? No   Urinary hesitancy? No   Dysuria (painful difficult urination)? No   Nocturia (waking up to use the bathroom)? Yes [3x/night]   Straining (having to push to go)?   No          Past Medical History  Past Medical History:   Diagnosis Date    GERD (gastroesophageal reflux disease)     [address]oma ([facility] [address] )     Hx       Past Social History  Past Surgical History:   Procedure Laterality Date    CHEST WALL BIOPSY Right 3/23/2017    Procedure: BREAST SCC EXCISION; LOCAL FLAP; FROZEN SECTION ;  Surgeon: Chris Da Silva MD;  [facility];  Service:     FOOT SURGERY Left     x 3     History   Smoking Status    Never Smoker   Smokeless Tobacco    Never Used       Past Family History  No family history on file  Past Social history  Social History     Social History    Marital status: /Civil Union     Spouse name: N/A    Number of children: N/A    Years of education: N/A     Occupational History    Not on file  Social History Main Topics    Smoking status: Never Smoker    Smokeless tobacco: Never Used    Alcohol use No    Drug use: No    Sexual activity: Not on file     Other Topics Concern    Not on file     Social History Narrative    No narrative on file       Current Medications  Current Outpatient Prescriptions   Medication Sig Dispense Refill    clindamycin (CLEOCIN T) 1 % lotion APPLY TO THE SKIN ONCE TO TWICE DAILY  1    esomeprazole (NexIUM) 40 MG capsule Take 40 mg by mouth every morning before breakfast      amoxicillin (AMOXIL) 500 MG tablet       cetirizine (ZyrTEC) 10 mg tablet Take 10 mg by mouth daily      docusate sodium (COLACE) 100 mg capsule Take 1 capsule (100 mg total) by mouth every 12 (twelve) hours (Patient not taking: Reported on 12/7/2018 ) 60 capsule 0    LORazepam (ATIVAN) 1 mg tablet Take 1 mg by mouth every 8 (eight) hours      Mirabegron ER (MYRBETRIQ) 50 MG TB24 Take 1 tablet (50 mg total) by mouth daily (Patient not taking: Reported on 12/7/2018 ) 90 tablet 3    oxybutynin (DITROPAN-XL) 5 mg 24 hr tablet Take 5 mg by mouth daily  6     No current facility-administered medications for this visit          Allergies  Allergies   Allergen Reactions    Codeine      "wired up"    Prochlorperazine Other (See Comments)     Unknown to patient         The following portions of the patient's history were reviewed and updated as appropriate: allergies, current medications, past medical history, past social history, past surgical history and problem list       Vitals  Vitals:    12/07/18 1058   BP: 130/82   BP Location: Left arm   Patient Position: Sitting   Pulse: 71   Weight: 72 6 kg (160 lb)   Height: 5' 2" (1 575 m)       Physical Exam  Constitutional   General appearance: Patient is seated and in no acute distress, well appearing and well nourished  Head and Face   Head and face: Normal     Eyes   Conjunctiva and lids: No erythema, swelling or discharge  Ears, Nose, Mouth, and Throat   Hearing: Normal     Pulmonary   Respiratory effort: No increased work of breathing or signs of respiratory distress  Cardiovascular   Examination of extremities for edema and/or varicosities: Normal     Abdomen   Abdomen: Non-tender, no masses  Musculoskeletal   Gait and station: Normal     Skin   Skin and subcutaneous tissue: Warm, dry, and intact  No visible lesions or rashes  Psychiatric   Judgment and insight: Normal  Recent and remote memory:  Normal  Mood and affect: Normal      Results  No results found for this or any previous visit (from the past 1 hour(s))  ]  No results found for: PSA  Lab Results   Component Value Date    CALCIUM 8 8 03/06/2017    K 3 7 03/06/2017    CO2 30 03/06/2017     03/06/2017    BUN 14 03/06/2017    CREATININE 0 95 03/06/2017     Lab Results   Component Value Date    WBC 4 90 03/06/2017    HGB 13 3 03/06/2017    HCT 40 2 03/06/2017    MCV 81 (L) 03/06/2017     03/06/2017       Orders  No orders of the defined types were placed in this encounter

## 2018-12-07 ENCOUNTER — OFFICE VISIT (OUTPATIENT)
Dept: UROLOGY | Facility: CLINIC | Age: 58
End: 2018-12-07
Payer: COMMERCIAL

## 2018-12-07 ENCOUNTER — TELEPHONE (OUTPATIENT)
Dept: UROLOGY | Facility: CLINIC | Age: 58
End: 2018-12-07

## 2018-12-07 VITALS
WEIGHT: 160 LBS | DIASTOLIC BLOOD PRESSURE: 82 MMHG | BODY MASS INDEX: 29.44 KG/M2 | HEIGHT: 62 IN | SYSTOLIC BLOOD PRESSURE: 130 MMHG | HEART RATE: 71 BPM

## 2018-12-07 DIAGNOSIS — R35.0 URINARY FREQUENCY: Primary | ICD-10-CM

## 2018-12-07 PROCEDURE — 99213 OFFICE O/P EST LOW 20 MIN: CPT | Performed by: PHYSICIAN ASSISTANT

## 2018-12-07 RX ORDER — AMOXICILLIN 500 MG/1
TABLET, FILM COATED ORAL
COMMUNITY
Start: 2018-12-05 | End: 2021-01-27 | Stop reason: ALTCHOICE

## 2018-12-07 RX ORDER — CLINDAMYCIN PHOSPHATE 10 UG/ML
LOTION TOPICAL
Refills: 1 | COMMUNITY
Start: 2018-11-28 | End: 2022-03-18 | Stop reason: ALTCHOICE

## 2018-12-07 RX ORDER — LORAZEPAM 1 MG/1
1 TABLET ORAL EVERY 8 HOURS PRN
COMMUNITY
Start: 2018-09-24 | End: 2018-12-07 | Stop reason: SDUPTHER

## 2018-12-07 NOTE — TELEPHONE ENCOUNTER
Dr Corina Decker patient seen at [facility] end       C/o frequency and nocturia  Tried myrbetriq with significant constipation and other anticholinergics are contraindicated  Please check insurance coverage of PTNS  Called and left a message for patient to see if she were planning any major insurance changes as of the first of the year

## 2018-12-07 NOTE — TELEPHONE ENCOUNTER
Patient was seen today by [facility] and requires PTNS  She would prefer the [facility] End location  She can be reached at [phone]

## 2018-12-07 NOTE — TELEPHONE ENCOUNTER
PTNS would require a prior auth and would not be covered at 100% until her 2,000 deductible would be met not sure if she still wants to have this done?  Let me know  Thanks  Perico Durham

## 2018-12-10 NOTE — TELEPHONE ENCOUNTER
Called and spoke with patient  Reviewed PTNS treatment at length including bladder diary recommendation  Also reviewed that her insurance would require authorization and she would be eligible for the cost until her deductible is met  Given this information, patient wishes to consider further given significant cost   She will call back once she makes up her mind  Will reach out to patient again the last week in Dec if we haven't heard anything by then  Patient agreeable to this plan

## 2018-12-26 NOTE — TELEPHONE ENCOUNTER
Called and spoke with patient  At this point given the cost and no guaruntee, she has decided against PTNS at this time  She will follow up as scheduled in August 2019    She knows to call with any concerns in the meantime or if she changes her mind and would like to try the ptns before August

## 2019-04-30 ENCOUNTER — OFFICE VISIT (OUTPATIENT)
Dept: UROLOGY | Facility: CLINIC | Age: 59
End: 2019-04-30
Payer: COMMERCIAL

## 2019-04-30 VITALS
DIASTOLIC BLOOD PRESSURE: 88 MMHG | WEIGHT: 167 LBS | SYSTOLIC BLOOD PRESSURE: 122 MMHG | HEART RATE: 60 BPM | BODY MASS INDEX: 30.54 KG/M2

## 2019-04-30 DIAGNOSIS — N39.41 URGE INCONTINENCE OF URINE: ICD-10-CM

## 2019-04-30 DIAGNOSIS — R35.0 URINARY FREQUENCY: Primary | ICD-10-CM

## 2019-04-30 LAB
POST-VOID RESIDUAL VOLUME, ML POC: 0 ML
SL AMB  POCT GLUCOSE, UA: NORMAL
SL AMB LEUKOCYTE ESTERASE,UA: NORMAL
SL AMB POCT BILIRUBIN,UA: NORMAL
SL AMB POCT BLOOD,UA: NORMAL
SL AMB POCT CLARITY,UA: CLEAR
SL AMB POCT COLOR,UA: YELLOW
SL AMB POCT KETONES,UA: NORMAL
SL AMB POCT NITRITE,UA: NORMAL
SL AMB POCT PH,UA: 5.5
SL AMB POCT SPECIFIC GRAVITY,UA: 1
SL AMB POCT URINE PROTEIN: NORMAL
SL AMB POCT UROBILINOGEN: NORMAL

## 2019-04-30 PROCEDURE — 99214 OFFICE O/P EST MOD 30 MIN: CPT | Performed by: UROLOGY

## 2019-04-30 PROCEDURE — 81002 URINALYSIS NONAUTO W/O SCOPE: CPT | Performed by: UROLOGY

## 2019-04-30 PROCEDURE — 51798 US URINE CAPACITY MEASURE: CPT | Performed by: UROLOGY

## 2019-05-29 PROBLEM — N32.81 OAB (OVERACTIVE BLADDER): Status: ACTIVE | Noted: 2019-05-29

## 2019-05-29 PROBLEM — R35.0 URINARY FREQUENCY: Status: RESOLVED | Noted: 2018-06-21 | Resolved: 2019-05-29

## 2019-05-30 ENCOUNTER — OFFICE VISIT (OUTPATIENT)
Dept: UROLOGY | Facility: CLINIC | Age: 59
End: 2019-05-30
Payer: COMMERCIAL

## 2019-05-30 VITALS
BODY MASS INDEX: 29.63 KG/M2 | SYSTOLIC BLOOD PRESSURE: 138 MMHG | DIASTOLIC BLOOD PRESSURE: 88 MMHG | WEIGHT: 161 LBS | HEART RATE: 80 BPM | HEIGHT: 62 IN

## 2019-05-30 DIAGNOSIS — N32.81 OAB (OVERACTIVE BLADDER): Primary | ICD-10-CM

## 2019-05-30 DIAGNOSIS — N39.41 URGE INCONTINENCE OF URINE: ICD-10-CM

## 2019-05-30 LAB — POST-VOID RESIDUAL VOLUME, ML POC: 7 ML

## 2019-05-30 PROCEDURE — 51798 US URINE CAPACITY MEASURE: CPT | Performed by: PHYSICIAN ASSISTANT

## 2019-05-30 PROCEDURE — 99213 OFFICE O/P EST LOW 20 MIN: CPT | Performed by: PHYSICIAN ASSISTANT

## 2019-08-02 ENCOUNTER — TRANSCRIBE ORDERS (OUTPATIENT)
Dept: ADMINISTRATIVE | Facility: HOSPITAL | Age: 59
End: 2019-08-02

## 2019-08-02 DIAGNOSIS — R10.13 ABDOMINAL PAIN, EPIGASTRIC: Primary | ICD-10-CM

## 2019-08-02 DIAGNOSIS — K21.9 GASTROESOPHAGEAL REFLUX DISEASE, ESOPHAGITIS PRESENCE NOT SPECIFIED: ICD-10-CM

## 2019-08-02 DIAGNOSIS — R14.0 ABDOMINAL DISTENTION: ICD-10-CM

## 2019-08-07 ENCOUNTER — HOSPITAL ENCOUNTER (OUTPATIENT)
Dept: ULTRASOUND IMAGING | Facility: MEDICAL CENTER | Age: 59
Discharge: HOME | End: 2019-08-07
Payer: COMMERCIAL

## 2019-08-07 DIAGNOSIS — R14.0 ABDOMINAL DISTENTION: ICD-10-CM

## 2019-08-07 DIAGNOSIS — K21.9 GASTROESOPHAGEAL REFLUX DISEASE, ESOPHAGITIS PRESENCE NOT SPECIFIED: ICD-10-CM

## 2019-08-07 DIAGNOSIS — R10.13 ABDOMINAL PAIN, EPIGASTRIC: ICD-10-CM

## 2019-08-07 PROCEDURE — 76705 ECHO EXAM OF ABDOMEN: CPT

## 2019-12-04 DIAGNOSIS — N39.41 URGE INCONTINENCE OF URINE: ICD-10-CM

## 2019-12-04 NOTE — TELEPHONE ENCOUNTER
Patient left a message on the Medication Refill voice mail line requesting a new prescription for Myrbetriq 50mg to [facility]/pharmacy in [facility] on [address]  Quantity was not specified  Prior authorization may also be required

## 2019-12-06 NOTE — TELEPHONE ENCOUNTER
The patient was last seen on 5/30/19 by Ernestina Barbosa PA-C in the [facility] location; continuation of the medication was authorized at that time    Request for same, 90 day supply with 2 refills was queued and forwarded to the Advanced Practitioner covering the [address] [address] location for approval

## 2020-01-02 ENCOUNTER — TRANSCRIBE ORDERS (OUTPATIENT)
Dept: ADMINISTRATIVE | Facility: HOSPITAL | Age: 60
End: 2020-01-02

## 2020-01-02 DIAGNOSIS — R10.11 RUQ PAIN: Primary | ICD-10-CM

## 2020-01-08 ENCOUNTER — HOSPITAL ENCOUNTER (OUTPATIENT)
Dept: ULTRASOUND IMAGING | Facility: MEDICAL CENTER | Age: 60
Discharge: HOME | End: 2020-01-08
Payer: COMMERCIAL

## 2020-01-08 DIAGNOSIS — R10.11 RUQ PAIN: ICD-10-CM

## 2020-01-08 PROCEDURE — 76705 ECHO EXAM OF ABDOMEN: CPT

## 2020-05-27 ENCOUNTER — TELEPHONE (OUTPATIENT)
Dept: UROLOGY | Facility: CLINIC | Age: 60
End: 2020-05-27

## 2020-06-04 ENCOUNTER — TELEMEDICINE (OUTPATIENT)
Dept: UROLOGY | Facility: CLINIC | Age: 60
End: 2020-06-04
Payer: COMMERCIAL

## 2020-06-04 DIAGNOSIS — N32.81 OAB (OVERACTIVE BLADDER): Primary | ICD-10-CM

## 2020-06-04 PROCEDURE — 99213 OFFICE O/P EST LOW 20 MIN: CPT | Performed by: UROLOGY

## 2020-10-07 DIAGNOSIS — N39.41 URGE INCONTINENCE OF URINE: ICD-10-CM

## 2020-10-07 RX ORDER — MIRABEGRON 50 MG/1
50 TABLET, FILM COATED, EXTENDED RELEASE ORAL DAILY
Qty: 90 TABLET | Refills: 3 | Status: SHIPPED | OUTPATIENT
Start: 2020-10-07 | End: 2022-03-18 | Stop reason: ALTCHOICE

## 2021-01-25 ENCOUNTER — TELEPHONE (OUTPATIENT)
Dept: UROLOGY | Facility: MEDICAL CENTER | Age: 61
End: 2021-01-25

## 2021-01-25 DIAGNOSIS — R39.9 UTI SYMPTOMS: Primary | ICD-10-CM

## 2021-01-25 NOTE — TELEPHONE ENCOUNTER
Called and spoke to patient  Put in orders for urine testing  Encouraged patient to aggressively hydrate and start over the counter AZO after she gives urine sample tomorrow  Sending to [facility] for further recommendations

## 2021-01-25 NOTE — TELEPHONE ENCOUNTER
Possible UTI  Dr Verónica Mendenhall     Patient believes they may have a possible UTI  Currently experiencing the following symptoms: urgency, burning and pain when urinating  It started this morning and its getting worse    Pain increasing as the day progressed     Patient can be reached at: [phone]

## 2021-01-26 ENCOUNTER — LAB (OUTPATIENT)
Dept: LAB | Facility: MEDICAL CENTER | Age: 61
End: 2021-01-26
Payer: COMMERCIAL

## 2021-01-26 ENCOUNTER — TELEPHONE (OUTPATIENT)
Dept: UROLOGY | Facility: AMBULATORY SURGERY CENTER | Age: 61
End: 2021-01-26

## 2021-01-26 DIAGNOSIS — R39.9 UTI SYMPTOMS: Primary | ICD-10-CM

## 2021-01-26 LAB

## 2021-01-26 PROCEDURE — 87186 SC STD MICRODIL/AGAR DIL: CPT

## 2021-01-26 PROCEDURE — 81001 URINALYSIS AUTO W/SCOPE: CPT

## 2021-01-26 PROCEDURE — 87086 URINE CULTURE/COLONY COUNT: CPT

## 2021-01-26 PROCEDURE — 87077 CULTURE AEROBIC IDENTIFY: CPT

## 2021-01-26 NOTE — TELEPHONE ENCOUNTER
Patient last seen Bindu Pittsr (stanislav) patient called in inquiring when urine tests have completed to call her to review results   Patient can be reached at [phone]

## 2021-01-27 RX ORDER — CEPHALEXIN 500 MG/1
500 CAPSULE ORAL EVERY 8 HOURS SCHEDULED
Qty: 21 CAPSULE | Refills: 0 | Status: SHIPPED | OUTPATIENT
Start: 2021-01-27 | End: 2021-02-03

## 2021-01-27 NOTE — TELEPHONE ENCOUNTER
Should schedule her for a nurse visit for a PVR  If she is not emptying her bladder we will have to discontinue her Myrbetriq    She should make sure she is hydrating well

## 2021-01-27 NOTE — TELEPHONE ENCOUNTER
Called and spoke with patient  Informed patient of urine culture results  Patient questioning about how she would have gotten a UTI  Informed patient that they could be caused from a number of things  Patient stated that with taking the myrbetriq she feels like she has to sit longer to urinate therefore she feels like she is not emptying her bladder  Patient would like to know how to proceed

## 2021-01-27 NOTE — TELEPHONE ENCOUNTER
Please let patient know her urine culture came back positive for Gram-negative rods  We do not have the final sensitivity  I will prescribe her Keflex 500 mg 3 times a day  However when the culture comes back we may need to change the antibiotic  Make sure she is aware this  We will only call her back if we need to change it    She should make sure she is hydrating well

## 2021-01-28 ENCOUNTER — CLINICAL SUPPORT (OUTPATIENT)
Dept: UROLOGY | Facility: CLINIC | Age: 61
End: 2021-01-28
Payer: COMMERCIAL

## 2021-01-28 VITALS
RESPIRATION RATE: 20 BRPM | DIASTOLIC BLOOD PRESSURE: 90 MMHG | HEART RATE: 72 BPM | SYSTOLIC BLOOD PRESSURE: 150 MMHG | WEIGHT: 167 LBS | BODY MASS INDEX: 30.73 KG/M2 | HEIGHT: 62 IN

## 2021-01-28 DIAGNOSIS — R39.9 UTI SYMPTOMS: Primary | ICD-10-CM

## 2021-01-28 LAB
BACTERIA UR CULT: ABNORMAL
BACTERIA UR CULT: ABNORMAL
POST-VOID RESIDUAL VOLUME, ML POC: 10 ML

## 2021-01-28 PROCEDURE — 51798 US URINE CAPACITY MEASURE: CPT

## 2021-01-28 NOTE — PROGRESS NOTES
Dina Kim is a 63 y o  female  Chief Complaint     Incomplete Bladder Emptying; Post Void Residual           Vitals:    01/28/21 1003   BP: 150/90   Pulse: 72   Resp: 20     Presently patient being treated for a positive urine culture with Keflex 500 mg TID for one week    When patient was notified of her positive urine culture she mentioned she needs to sit longer to empty her bladder since the start of Mybetriq  Daniella Creed ordered post void residual check  Recent Results (from the past 1 hour(s))   POCT Measure PVR    Collection Time: 01/28/21 10:45 AM   Result Value Ref Range    POST-VOID RESIDUAL VOLUME, ML POC 10 mL   Reassured patient she is emptying her bladder despite having an urinary tract infection  Patient wishes to hold off on the Myrbetriq for now  Will complete antibiotic therapy and then decide if the Myrbetriq is needed  Encouraged patient to hydrate well   With water

## 2021-02-05 ENCOUNTER — TELEPHONE (OUTPATIENT)
Dept: UROLOGY | Facility: AMBULATORY SURGERY CENTER | Age: 61
End: 2021-02-05

## 2021-02-05 DIAGNOSIS — R39.9 UTI SYMPTOMS: Primary | ICD-10-CM

## 2021-02-05 RX ORDER — CEPHALEXIN 500 MG/1
500 CAPSULE ORAL EVERY 8 HOURS SCHEDULED
Qty: 15 CAPSULE | Refills: 0 | Status: SHIPPED | OUTPATIENT
Start: 2021-02-05 | End: 2021-02-10

## 2021-02-05 NOTE — TELEPHONE ENCOUNTER
With the itching and burning of the more concerned of her having a possible yeast infection related to recent antibiotic use  She should repeat her urine culture next week if symptoms continue  Make sure she is hydrating well, avoiding bladder irritants take over-the-counter azo for discomfort    I will give her a few extra days of the antibiotic

## 2021-02-05 NOTE — TELEPHONE ENCOUNTER
Patient managed by Dr Jann Johnson  Patient called in stating she completed her last round of antibiotics and now she feels like the uti recurred  Patient stated she has burning and itching   Patient can be reached at [phone]

## 2021-02-08 ENCOUNTER — LAB (OUTPATIENT)
Dept: LAB | Facility: MEDICAL CENTER | Age: 61
End: 2021-02-08
Payer: COMMERCIAL

## 2021-02-08 DIAGNOSIS — R39.9 UTI SYMPTOMS: ICD-10-CM

## 2021-02-08 PROCEDURE — 87086 URINE CULTURE/COLONY COUNT: CPT

## 2021-02-10 LAB — BACTERIA UR CULT: NORMAL

## 2021-02-11 ENCOUNTER — TELEPHONE (OUTPATIENT)
Dept: UROLOGY | Facility: AMBULATORY SURGERY CENTER | Age: 61
End: 2021-02-11

## 2021-02-11 DIAGNOSIS — B37.9 YEAST INFECTION: Primary | ICD-10-CM

## 2021-02-11 RX ORDER — FLUCONAZOLE 150 MG/1
150 TABLET ORAL ONCE
Qty: 1 TABLET | Refills: 0 | Status: SHIPPED | OUTPATIENT
Start: 2021-02-11 | End: 2021-02-11

## 2021-02-11 NOTE — TELEPHONE ENCOUNTER
Spoke with patient and informed her that Diflucan was sent to the pharmacy on file ans she is to take just 1 dose  Informed patient to give it a few days to work   Patient verbalized understanding

## 2021-02-11 NOTE — TELEPHONE ENCOUNTER
Please let patient know her urinalysis was unremarkable  If she continues with itching and burning  She may have a yeast infection  Does she notice any discharge?

## 2021-02-11 NOTE — TELEPHONE ENCOUNTER
Contacted and spoke with patient about her symptoms  Informed patient her urine culture is unremarkable but the itching at the meatus persists  Patient denies any type of discharge  Patient feels the itching is worsening  No longer has pain with urination  Patient asking about using OTC AZO  Will send encounter to [facility] for her advise then call patient back

## 2021-03-10 DIAGNOSIS — Z23 ENCOUNTER FOR IMMUNIZATION: ICD-10-CM

## 2021-03-18 ENCOUNTER — TRANSCRIBE ORDERS (OUTPATIENT)
Dept: ADMINISTRATIVE | Facility: HOSPITAL | Age: 61
End: 2021-03-18

## 2021-03-18 ENCOUNTER — APPOINTMENT (OUTPATIENT)
Dept: LAB | Facility: MEDICAL CENTER | Age: 61
End: 2021-03-18
Payer: COMMERCIAL

## 2021-03-18 ENCOUNTER — CLINICAL SUPPORT (OUTPATIENT)
Dept: URGENT CARE | Facility: MEDICAL CENTER | Age: 61
End: 2021-03-18
Payer: COMMERCIAL

## 2021-03-18 DIAGNOSIS — Z01.810 PRE-OPERATIVE CARDIOVASCULAR EXAMINATION: ICD-10-CM

## 2021-03-18 DIAGNOSIS — Z01.812 PRE-OPERATIVE LABORATORY EXAMINATION: ICD-10-CM

## 2021-03-18 DIAGNOSIS — Z01.818 OTHER SPECIFIED PRE-OPERATIVE EXAMINATION: ICD-10-CM

## 2021-03-18 DIAGNOSIS — D48.5 NEOPLASM OF UNCERTAIN BEHAVIOR OF SKIN: Primary | ICD-10-CM

## 2021-03-18 DIAGNOSIS — D48.5 NEOPLASM OF UNCERTAIN BEHAVIOR OF SKIN: ICD-10-CM

## 2021-03-18 LAB
ANION GAP SERPL CALCULATED.3IONS-SCNC: 9 MMOL/L (ref 4–13)
ATRIAL RATE: 54 BPM
ATRIAL RATE: 56 BPM
BASOPHILS # BLD AUTO: 0.07 THOUSANDS/ΜL (ref 0–0.1)
BASOPHILS NFR BLD AUTO: 1 % (ref 0–1)
BUN SERPL-MCNC: 16 MG/DL (ref 5–25)
CALCIUM SERPL-MCNC: 9.2 MG/DL (ref 8.3–10.1)
CHLORIDE SERPL-SCNC: 106 MMOL/L (ref 100–108)
CO2 SERPL-SCNC: 28 MMOL/L (ref 21–32)
CREAT SERPL-MCNC: 1.07 MG/DL (ref 0.6–1.3)
EOSINOPHIL # BLD AUTO: 0.09 THOUSAND/ΜL (ref 0–0.61)
EOSINOPHIL NFR BLD AUTO: 2 % (ref 0–6)
ERYTHROCYTE [DISTWIDTH] IN BLOOD BY AUTOMATED COUNT: 13.6 % (ref 11.6–15.1)
GFR SERPL CREATININE-BSD FRML MDRD: 56 ML/MIN/1.73SQ M
GLUCOSE P FAST SERPL-MCNC: 91 MG/DL (ref 65–99)
HCT VFR BLD AUTO: 43.4 % (ref 34.8–46.1)
HGB BLD-MCNC: 14.3 G/DL (ref 11.5–15.4)
IMM GRANULOCYTES # BLD AUTO: 0.01 THOUSAND/UL (ref 0–0.2)
IMM GRANULOCYTES NFR BLD AUTO: 0 % (ref 0–2)
LYMPHOCYTES # BLD AUTO: 2.93 THOUSANDS/ΜL (ref 0.6–4.47)
LYMPHOCYTES NFR BLD AUTO: 47 % (ref 14–44)
MCH RBC QN AUTO: 27.3 PG (ref 26.8–34.3)
MCHC RBC AUTO-ENTMCNC: 32.9 G/DL (ref 31.4–37.4)
MCV RBC AUTO: 83 FL (ref 82–98)
MONOCYTES # BLD AUTO: 0.39 THOUSAND/ΜL (ref 0.17–1.22)
MONOCYTES NFR BLD AUTO: 6 % (ref 4–12)
NEUTROPHILS # BLD AUTO: 2.7 THOUSANDS/ΜL (ref 1.85–7.62)
NEUTS SEG NFR BLD AUTO: 44 % (ref 43–75)
NRBC BLD AUTO-RTO: 0 /100 WBCS
P AXIS: 58 DEGREES
PLATELET # BLD AUTO: 247 THOUSANDS/UL (ref 149–390)
PMV BLD AUTO: 10.7 FL (ref 8.9–12.7)
POTASSIUM SERPL-SCNC: 4.1 MMOL/L (ref 3.5–5.3)
PR INTERVAL: 138 MS
PR INTERVAL: 162 MS
QRS AXIS: 139 DEGREES
QRS AXIS: 35 DEGREES
QRSD INTERVAL: 84 MS
QRSD INTERVAL: 86 MS
QT INTERVAL: 422 MS
QT INTERVAL: 434 MS
QTC INTERVAL: 407 MS
QTC INTERVAL: 411 MS
RBC # BLD AUTO: 5.24 MILLION/UL (ref 3.81–5.12)
SODIUM SERPL-SCNC: 143 MMOL/L (ref 136–145)
T WAVE AXIS: 123 DEGREES
T WAVE AXIS: 50 DEGREES
VENTRICULAR RATE: 54 BPM
VENTRICULAR RATE: 56 BPM
WBC # BLD AUTO: 6.19 THOUSAND/UL (ref 4.31–10.16)

## 2021-03-18 PROCEDURE — 80048 BASIC METABOLIC PNL TOTAL CA: CPT

## 2021-03-18 PROCEDURE — 85025 COMPLETE CBC W/AUTO DIFF WBC: CPT

## 2021-03-18 PROCEDURE — 93010 ELECTROCARDIOGRAM REPORT: CPT

## 2021-03-18 PROCEDURE — 93005 ELECTROCARDIOGRAM TRACING: CPT

## 2021-03-18 PROCEDURE — 36415 COLL VENOUS BLD VENIPUNCTURE: CPT

## 2021-03-22 PROBLEM — F41.9 ANXIETY: Status: ACTIVE | Noted: 2021-03-22

## 2021-03-22 PROBLEM — R19.7 DIARRHEA: Status: ACTIVE | Noted: 2021-03-22

## 2021-03-22 PROBLEM — K31.7 HYPERPLASTIC POLYPS OF STOMACH: Status: ACTIVE | Noted: 2021-03-22

## 2021-03-23 NOTE — PRE-PROCEDURE INSTRUCTIONS
Pre-Surgery Instructions:   Medication Instructions    cetirizine (ZyrTEC) 10 mg tablet Instructed patient per Anesthesia Guidelines   LORazepam (ATIVAN) 1 mg tablet Instructed patient per Anesthesia Guidelines   NexIUM 40 MG capsule Instructed patient per Anesthesia Guidelines  Instructed has no medications to take the morning of surgery  Had instructions from surgeon no aspirin, NSAIDs, vitamins, or supplements 1 week before surgery

## 2021-03-25 ENCOUNTER — ANESTHESIA EVENT (OUTPATIENT)
Dept: PERIOP | Facility: HOSPITAL | Age: 61
End: 2021-03-25
Payer: COMMERCIAL

## 2021-03-25 NOTE — ANESTHESIA PREPROCEDURE EVALUATION
Procedure:  EXCISION SHOULDER MELANOMA W/LOCAL FLAP (Right Shoulder)    Relevant Problems   GI/HEPATIC   (+) Gastroesophageal reflux disease (pt unable lay flat  )      NEURO/PSYCH   (+) Anxiety      PULMONARY   (-) Asthma   (-) Smoking   (-) URI (upper respiratory infection)             Anesthesia Plan  ASA Score- 2     Anesthesia Type- IV sedation with anesthesia with ASA Monitors  Additional Monitors:   Airway Plan:           Plan Factors-    Chart reviewed  Induction-     Postoperative Plan-     Informed Consent- Anesthetic plan and risks discussed with patient  I personally reviewed this patient with the CRNA  Discussed and agreed on the Anesthesia Plan with the CRNA  Brittanie Cunningham

## 2021-03-26 ENCOUNTER — ANESTHESIA (OUTPATIENT)
Dept: PERIOP | Facility: HOSPITAL | Age: 61
End: 2021-03-26
Payer: COMMERCIAL

## 2021-03-26 ENCOUNTER — HOSPITAL ENCOUNTER (OUTPATIENT)
Facility: HOSPITAL | Age: 61
Setting detail: OUTPATIENT SURGERY
Discharge: HOME | End: 2021-03-26
Attending: PLASTIC SURGERY | Admitting: PLASTIC SURGERY
Payer: COMMERCIAL

## 2021-03-26 VITALS
WEIGHT: 163 LBS | HEIGHT: 62 IN | SYSTOLIC BLOOD PRESSURE: 151 MMHG | BODY MASS INDEX: 30 KG/M2 | OXYGEN SATURATION: 96 % | RESPIRATION RATE: 16 BRPM | TEMPERATURE: 97.8 F | DIASTOLIC BLOOD PRESSURE: 63 MMHG | HEART RATE: 60 BPM

## 2021-03-26 DIAGNOSIS — D03.61 MELANOMA IN SITU OF RIGHT UPPER LIMB, INCLUDING SHOULDER (HCC): ICD-10-CM

## 2021-03-26 PROBLEM — K21.9 GASTROESOPHAGEAL REFLUX DISEASE: Status: ACTIVE | Noted: 2021-03-26

## 2021-03-26 PROCEDURE — 88305 TISSUE EXAM BY PATHOLOGIST: CPT | Performed by: PATHOLOGY

## 2021-03-26 RX ORDER — PROPOFOL 10 MG/ML
INJECTION, EMULSION INTRAVENOUS AS NEEDED
Status: DISCONTINUED | OUTPATIENT
Start: 2021-03-26 | End: 2021-03-26

## 2021-03-26 RX ORDER — DIPHENHYDRAMINE HYDROCHLORIDE 50 MG/ML
12.5 INJECTION INTRAMUSCULAR; INTRAVENOUS ONCE AS NEEDED
Status: DISCONTINUED | OUTPATIENT
Start: 2021-03-26 | End: 2021-03-26 | Stop reason: HOSPADM

## 2021-03-26 RX ORDER — CEFAZOLIN SODIUM 2 G/50ML
2000 SOLUTION INTRAVENOUS ONCE
Status: DISCONTINUED | OUTPATIENT
Start: 2021-03-26 | End: 2021-03-26 | Stop reason: HOSPADM

## 2021-03-26 RX ORDER — MAGNESIUM HYDROXIDE 1200 MG/15ML
LIQUID ORAL AS NEEDED
Status: DISCONTINUED | OUTPATIENT
Start: 2021-03-26 | End: 2021-03-26 | Stop reason: HOSPADM

## 2021-03-26 RX ORDER — METOCLOPRAMIDE HYDROCHLORIDE 5 MG/ML
INJECTION INTRAMUSCULAR; INTRAVENOUS AS NEEDED
Status: DISCONTINUED | OUTPATIENT
Start: 2021-03-26 | End: 2021-03-26

## 2021-03-26 RX ORDER — MIDAZOLAM HYDROCHLORIDE 2 MG/2ML
INJECTION, SOLUTION INTRAMUSCULAR; INTRAVENOUS AS NEEDED
Status: DISCONTINUED | OUTPATIENT
Start: 2021-03-26 | End: 2021-03-26

## 2021-03-26 RX ORDER — HYDROMORPHONE HCL/PF 1 MG/ML
0.5 SYRINGE (ML) INJECTION
Status: DISCONTINUED | OUTPATIENT
Start: 2021-03-26 | End: 2021-03-26 | Stop reason: HOSPADM

## 2021-03-26 RX ORDER — FENTANYL CITRATE 50 UG/ML
INJECTION, SOLUTION INTRAMUSCULAR; INTRAVENOUS AS NEEDED
Status: DISCONTINUED | OUTPATIENT
Start: 2021-03-26 | End: 2021-03-26

## 2021-03-26 RX ORDER — PROPOFOL 10 MG/ML
INJECTION, EMULSION INTRAVENOUS CONTINUOUS PRN
Status: DISCONTINUED | OUTPATIENT
Start: 2021-03-26 | End: 2021-03-26

## 2021-03-26 RX ORDER — FENTANYL CITRATE/PF 50 MCG/ML
25 SYRINGE (ML) INJECTION
Status: DISCONTINUED | OUTPATIENT
Start: 2021-03-26 | End: 2021-03-26 | Stop reason: HOSPADM

## 2021-03-26 RX ORDER — ONDANSETRON 2 MG/ML
4 INJECTION INTRAMUSCULAR; INTRAVENOUS EVERY 8 HOURS PRN
Status: DISCONTINUED | OUTPATIENT
Start: 2021-03-26 | End: 2021-03-26 | Stop reason: HOSPADM

## 2021-03-26 RX ORDER — LIDOCAINE HYDROCHLORIDE AND EPINEPHRINE 10; 10 MG/ML; UG/ML
INJECTION, SOLUTION INFILTRATION; PERINEURAL AS NEEDED
Status: DISCONTINUED | OUTPATIENT
Start: 2021-03-26 | End: 2021-03-26 | Stop reason: HOSPADM

## 2021-03-26 RX ORDER — ACETAMINOPHEN 325 MG/1
650 TABLET ORAL EVERY 6 HOURS PRN
Status: DISCONTINUED | OUTPATIENT
Start: 2021-03-26 | End: 2021-03-26 | Stop reason: HOSPADM

## 2021-03-26 RX ORDER — CEFAZOLIN SODIUM 2 G/50ML
SOLUTION INTRAVENOUS AS NEEDED
Status: DISCONTINUED | OUTPATIENT
Start: 2021-03-26 | End: 2021-03-26

## 2021-03-26 RX ORDER — GLYCOPYRROLATE 0.2 MG/ML
INJECTION INTRAMUSCULAR; INTRAVENOUS AS NEEDED
Status: DISCONTINUED | OUTPATIENT
Start: 2021-03-26 | End: 2021-03-26

## 2021-03-26 RX ORDER — SODIUM CHLORIDE, SODIUM LACTATE, POTASSIUM CHLORIDE, CALCIUM CHLORIDE 600; 310; 30; 20 MG/100ML; MG/100ML; MG/100ML; MG/100ML
50 INJECTION, SOLUTION INTRAVENOUS CONTINUOUS
Status: DISCONTINUED | OUTPATIENT
Start: 2021-03-26 | End: 2021-03-26 | Stop reason: HOSPADM

## 2021-03-26 RX ORDER — KETAMINE HYDROCHLORIDE 50 MG/ML
INJECTION, SOLUTION, CONCENTRATE INTRAMUSCULAR; INTRAVENOUS AS NEEDED
Status: DISCONTINUED | OUTPATIENT
Start: 2021-03-26 | End: 2021-03-26

## 2021-03-26 RX ADMIN — PROPOFOL 150 MCG/KG/MIN: 10 INJECTION, EMULSION INTRAVENOUS at 10:58

## 2021-03-26 RX ADMIN — SODIUM CHLORIDE, SODIUM LACTATE, POTASSIUM CHLORIDE, AND CALCIUM CHLORIDE: .6; .31; .03; .02 INJECTION, SOLUTION INTRAVENOUS at 10:42

## 2021-03-26 RX ADMIN — MIDAZOLAM HYDROCHLORIDE 2 MG: 1 INJECTION, SOLUTION INTRAMUSCULAR; INTRAVENOUS at 10:50

## 2021-03-26 RX ADMIN — CEFAZOLIN SODIUM 2000 MG: 2 SOLUTION INTRAVENOUS at 10:42

## 2021-03-26 RX ADMIN — GLYCOPYRROLATE 0.2 MG: 0.2 INJECTION, SOLUTION INTRAMUSCULAR; INTRAVENOUS at 10:50

## 2021-03-26 RX ADMIN — FENTANYL CITRATE 50 MCG: 50 INJECTION, SOLUTION INTRAMUSCULAR; INTRAVENOUS at 10:56

## 2021-03-26 RX ADMIN — SODIUM CHLORIDE, SODIUM LACTATE, POTASSIUM CHLORIDE, AND CALCIUM CHLORIDE: .6; .31; .03; .02 INJECTION, SOLUTION INTRAVENOUS at 11:20

## 2021-03-26 RX ADMIN — PROPOFOL 30 MG: 10 INJECTION, EMULSION INTRAVENOUS at 10:58

## 2021-03-26 RX ADMIN — FENTANYL CITRATE 50 MCG: 50 INJECTION, SOLUTION INTRAMUSCULAR; INTRAVENOUS at 10:53

## 2021-03-26 RX ADMIN — METOCLOPRAMIDE 10 MG: 5 INJECTION, SOLUTION INTRAMUSCULAR; INTRAVENOUS at 10:53

## 2021-03-26 RX ADMIN — KETAMINE HYDROCHLORIDE 15 MG: 50 INJECTION, SOLUTION INTRAMUSCULAR; INTRAVENOUS at 11:17

## 2021-03-26 RX ADMIN — KETAMINE HYDROCHLORIDE 25 MG: 50 INJECTION, SOLUTION INTRAMUSCULAR; INTRAVENOUS at 11:04

## 2021-03-26 RX ADMIN — KETAMINE HYDROCHLORIDE 10 MG: 50 INJECTION, SOLUTION INTRAMUSCULAR; INTRAVENOUS at 11:10

## 2021-03-26 NOTE — OP NOTE
OPERATIVE REPORT  PATIENT NAME: Lisa Jensen    :  1960  MRN: [id number]  Pt Location: [facility] OR ROOM 08    SURGERY DATE: 3/26/2021    Surgeon(s) and Role:     Kiah Baumann MD - Primary    Preop Diagnosis:  Melanoma in situ of right upper limb, including shoulder ([address] ) [D03 61]    Post-Op Diagnosis Codes:     * Melanoma in situ of right upper limb, including shoulder ([address] ) [D03 61]    Procedure(s) (LRB):  EXCISION SHOULDER MELANOMA W/LOCAL FLAP (Right)    Specimen(s):  ID Type Source Tests Collected by Time Destination   1 : nelanoma of right shoulder see markings below Tissue Lesion TISSUE Clarisse Langley MD 3/26/2021 1111        Estimated Blood Loss:   Minimal    Drains:  * No LDAs found *    Anesthesia Type:   IV Sedation with Anesthesia    Operative Indications:  Melanoma in situ of right upper limb, including shoulder ([address] ) [D03 61]      Operative Findings:      Complications:   None    Procedure and Technique:  Patient was brought to the operating room and placed supine on the operating room table  Time-out procedure was performed SCDs were applied and IV antibiotics were given  After adequate anesthesia was obtained the right upper extremity was prepped and draped using standard surgical technique  Attention was turned to the right shoulder melanoma  5 mm margins were designed around the lesion  This pattern was amputated subcutaneous plane including 5 mm of depth of the lesion  This was marked for orientation and sent for final pathology  The lesion plus margins was 2 4 cm  A local flap was designed in order to avoid problematic scarring at the shoulder  An incision was carried distally and anteriorly  The anterior skin was elevated in a subcutaneous plane was rotated and advanced distally and proximally for a flap plus defect of 8 centimeters squared    The flap was inset using 4-0 PDS suture in interrupted deep dermal technique followed by 4-0 strata fix suture in running subcuticular technique  The wounds were cleaned and dried and skin glue was applied       I was present for the entire procedure and A qualified resident physician was not available    Patient Disposition:  hemodynamically stable    SIGNATURE: Ulisses Crowe MD  DATE: March 26, 2021  TIME: 11:33 AM

## 2021-03-26 NOTE — ANESTHESIA POSTPROCEDURE EVALUATION
Post-Op Assessment Note    CV Status:  Stable  Pain Score: 0    Pain management: satisfactory to patient     Mental Status:  Awake   Hydration Status:  Stable   PONV Controlled:  None   Airway Patency:  Patent   Two or more mitigation strategies used for obstructive sleep apnea   Post Op Vitals Reviewed: Yes      Staff: Anesthesiologist, with CRNAs         No complications documented      /66   Temp     Pulse  903   Resp   18   SpO2   94

## 2021-03-26 NOTE — DISCHARGE INSTRUCTIONS
[address], [address], [address], \[facility]\"", [address] [facility] [address] [facility] [employer]  [facility]       Avoid direct sunlight    No ice or heating pack    Ok to shower, avoid direct water pressure on incision    No exercise or outdoor activities    No strenuous activity    Skin glue was applied     Call [phone] for an appointment in 7-14 days

## 2021-03-26 NOTE — DISCHARGE SUMMARY
Discharge Summary - Medical Vinita Kehr 63 y o  female MRN: [id number]    [facility] PACU Room / Bed: OR POOL/OR POOL Encounter: [id number]    BRIEF OVERVIEW  Admitting Provider: Maite uGo MD  Discharge Provider: Maite Guo MD  Primary Care Physician at Discharge: Irene Morataya [phone]    Discharge To: Home      Admission Date: 3/26/2021     Discharge Date: No discharge date for patient encounter  Code Status: No Order  Advance Directive and Living Will: <no information>  Power of :        Primary Discharge Diagnosis  Active Problems:    * No active hospital problems  *  Resolved Problems:    * No resolved hospital problems   *        Discharge Disposition: [address] [place] [address]    Presenting Problem/History of Present Illness  <principal problem not specified>      Discharge Condition: stable    Patient tolerated the procedure well, recovered and was discharged home in stable condition    Maite Guo MD  3/26/2021  11:33 AM

## 2021-06-14 ENCOUNTER — EVALUATION (OUTPATIENT)
Dept: PHYSICAL THERAPY | Facility: CLINIC | Age: 61
End: 2021-06-14
Payer: COMMERCIAL

## 2021-06-14 ENCOUNTER — TRANSCRIBE ORDERS (OUTPATIENT)
Dept: PHYSICAL THERAPY | Facility: CLINIC | Age: 61
End: 2021-06-14

## 2021-06-14 DIAGNOSIS — M25.511 ACUTE PAIN OF RIGHT SHOULDER: Primary | ICD-10-CM

## 2021-06-14 PROCEDURE — 97112 NEUROMUSCULAR REEDUCATION: CPT | Performed by: PHYSICAL THERAPIST

## 2021-06-14 PROCEDURE — 97161 PT EVAL LOW COMPLEX 20 MIN: CPT | Performed by: PHYSICAL THERAPIST

## 2021-06-14 NOTE — PROGRESS NOTES
PT Evaluation     Today's date: 2021  Patient name: Kin Castro  : [birth year]  MRN: [id number]  Referring provider: Kamryn Christianson PA-C  Dx:   Encounter Diagnosis     ICD-10-CM    1  Acute pain of right shoulder  M25 511        Start Time: 935  Stop Time:   Total time in clinic (min): 40 minutes    Assessment  Assessment details: 63year old female patient reports to PT with acute R shoulder pain since patient had melanoma removed from her R shoulder in March  No red flags noted and patient denies numbness/tinging  Patient primary movement impairment related to her symptoms is decreased R shoulder and scapular musculature strength  Patient will benefit from skilled PT services to address current impairments and functional limitations to help patient return to her PLOF  Impairments: activity intolerance, impaired physical strength, lacks appropriate home exercise program and pain with function    Symptom irritability: lowUnderstanding of Dx/Px/POC: good   Prognosis: good    Goals  STG  1  Patient will be independent with completion of HEP throughout therapy  2  Patient will be able to lie on her R side without discomfort in 3 weeks  LTG  1  Patient will work at her desk without increased discomfort in her R shoulder in 4 weeks  2  Patient will golf without increased discomfort in her R shoulder in 4 weeks  Plan  Patient would benefit from: skilled physical therapy  Planned therapy interventions: joint mobilization, manual therapy, neuromuscular re-education, patient education, strengthening, stretching, therapeutic exercise, therapeutic activities, home exercise program, functional ROM exercises and flexibility  Frequency: 2x week  Duration in weeks: 6  Treatment plan discussed with: patient        Subjective Evaluation    History of Present Illness  Mechanism of injury: Patient reports with R shoulder pain that started after patient had melanoma surgery that was on 3/26/2021   Patient denies numbness/tingling  Patient reports her symptoms go up to the right side of her neck  Patient has been unable to sleep on her R side since the surgery and has some discomfort when she goes golfing  Patient also has some difficulty using the mouse with her R UE due to it hurting as well  Patient is a golfer     Pain  Current pain ratin  At best pain ratin  At worst pain ratin  Quality: dull ache and radiating  Relieving factors: change in position and heat    Treatments  Current treatment: physical therapy  Patient Goals  Patient goals for therapy: decreased pain, return to sport/leisure activities and increased strength          Objective     Active Range of Motion   Cervical/Thoracic Spine       Cervical    Flexion:  WFL  Extension:  Restriction level: minimal  Left lateral flexion:  WFL  Right lateral flexion:  WFL  Left rotation:  WFL  Right rotation:  [facility]  Left Shoulder   Normal active range of motion  External rotation BTH: T3   Internal rotation BTB: T8     Right Shoulder   Normal active range of motion  External rotation BTH: T3   Internal rotation BTB: T8     Passive Range of Motion   Cervical/Thoracic Spine   Cervical     Flexion (degrees):  WFL  Extension (degrees):  WFL  Left lateral flexion (degrees):  WFL  Right lateral flexion (degrees):  WFL  Left rotation (degrees):  WFL  Right rotation (degrees):  WFL  Left Shoulder   Flexion: WFL  Abduction: WFL  External rotation 90°: WFL  Internal rotation 90°: WFL    Right Shoulder   Flexion: WFL  Abduction: WFL  External rotation 90°: WFL  Internal rotation 90°: WFL    Strength/Myotome Testing     Left Shoulder     Planes of Motion   Flexion: 4   Abduction: 4   External rotation at 0°: 4   Internal rotation at 0°: 4     Isolated Muscles   Lower trapezius: 4-   Middle trapezius: 4-   Serratus anterior: 4-     Right Shoulder     Planes of Motion   Flexion: 4-   Abduction: 4-   External rotation at 0°: 4-   Internal rotation at 0°: 4-     Isolated Muscles   Lower trapezius: 3+   Middle trapezius: 3+   Serratus anterior: 3+              Precautions: melanoma      Manuals 6/14            R cervical mx STM As needed                                                   Neuro Re-Ed             Supine serratus punches r                         Serratus wall slides             Ball on wall             Upper trap overhead shrug             Wall slides w/ lift off                          Ther Ex             Sidelying shoulder ER  r            Prone low trap retraction r                         UBE             Shoulder ER MRE             Standing shoulder IR janice             Standing straight arm pull downs                           Ther Activity                                       Gait Training                                       Modalities

## 2021-06-14 NOTE — LETTER
2021    Laura Chand PA-C  [address]    Patient: Mirela Nazario   YOB: 1960   Date of Visit: 2021     Encounter Diagnosis     ICD-10-CM    1  Acute pain of right shoulder  M25 511        Dear Dr Santino Bass: Thank you for your recent referral of Mirela Nazario  Please review the attached evaluation summary from Gely's recent visit  Please verify that you agree with the plan of care by signing the attached order  If you have any questions or concerns, please do not hesitate to call  I sincerely appreciate the opportunity to share in the care of one of your patients and hope to have another opportunity to work with you in the near future  Sincerely,    Mitzi Garcia, PT      Referring Provider:      I certify that I have read the below Plan of Care and certify the need for these services furnished under this plan of treatment while under my care  Laura Chand PA-C  [address] [place] [address]  Via Fax: [phone]          PT Evaluation     Today's date: 2021  Patient name: Mirela Nazario  : [birth year]  MRN: [id number]  Referring provider: Enrique Quarles PA-C  Dx:   Encounter Diagnosis     ICD-10-CM    1  Acute pain of right shoulder  M25 511        Start Time: 935  Stop Time:   Total time in clinic (min): 40 minutes    Assessment  Assessment details: 63year old female patient reports to PT with acute R shoulder pain since patient had melanoma removed from her R shoulder in March  No red flags noted and patient denies numbness/tinging  Patient primary movement impairment related to her symptoms is decreased R shoulder and scapular musculature strength  Patient will benefit from skilled PT services to address current impairments and functional limitations to help patient return to her PLOF         Impairments: activity intolerance, impaired physical strength, lacks appropriate home exercise program and pain with function    Symptom irritability: lowUnderstanding of Dx/Px/POC: good   Prognosis: good    Goals  STG  1  Patient will be independent with completion of HEP throughout therapy  2  Patient will be able to lie on her R side without discomfort in 3 weeks  LTG  1  Patient will work at her desk without increased discomfort in her R shoulder in 4 weeks  2  Patient will golf without increased discomfort in her R shoulder in 4 weeks  Plan  Patient would benefit from: skilled physical therapy  Planned therapy interventions: joint mobilization, manual therapy, neuromuscular re-education, patient education, strengthening, stretching, therapeutic exercise, therapeutic activities, home exercise program, functional ROM exercises and flexibility  Frequency: 2x week  Duration in weeks: 6  Treatment plan discussed with: patient        Subjective Evaluation    History of Present Illness  Mechanism of injury: Patient reports with R shoulder pain that started after patient had melanoma surgery that was on 3/26/2021  Patient denies numbness/tingling  Patient reports her symptoms go up to the right side of her neck  Patient has been unable to sleep on her R side since the surgery and has some discomfort when she goes golfing  Patient also has some difficulty using the mouse with her R UE due to it hurting as well  Patient is a golfer     Pain  Current pain ratin  At best pain ratin  At worst pain ratin  Quality: dull ache and radiating  Relieving factors: change in position and heat    Treatments  Current treatment: physical therapy  Patient Goals  Patient goals for therapy: decreased pain, return to sport/leisure activities and increased strength          Objective     Active Range of Motion   Cervical/Thoracic Spine       Cervical    Flexion:  WFL  Extension:  Restriction level: minimal  Left lateral flexion:  WFL  Right lateral flexion:  WFL  Left rotation:  [facility]  Right rotation: WFL  Left Shoulder   Normal active range of motion  External rotation BTH: T3   Internal rotation BTB: T8     Right Shoulder   Normal active range of motion  External rotation BTH: T3   Internal rotation BTB: T8     Passive Range of Motion   Cervical/Thoracic Spine   Cervical     Flexion (degrees):  WFL  Extension (degrees):  WFL  Left lateral flexion (degrees):  WFL  Right lateral flexion (degrees):  WFL  Left rotation (degrees):  WFL  Right rotation (degrees):  WFL  Left Shoulder   Flexion: WFL  Abduction: WFL  External rotation 90°: WFL  Internal rotation 90°: WFL    Right Shoulder   Flexion: WFL  Abduction: WFL  External rotation 90°: WFL  Internal rotation 90°: WFL    Strength/Myotome Testing     Left Shoulder     Planes of Motion   Flexion: 4   Abduction: 4   External rotation at 0°: 4   Internal rotation at 0°: 4     Isolated Muscles   Lower trapezius: 4-   Middle trapezius: 4-   Serratus anterior: 4-     Right Shoulder     Planes of Motion   Flexion: 4-   Abduction: 4-   External rotation at 0°: 4-   Internal rotation at 0°: 4-     Isolated Muscles   Lower trapezius: 3+   Middle trapezius: 3+   Serratus anterior: 3+              Precautions: melanoma      Manuals 6/14            R cervical mx STM As needed                                                   Neuro Re-Ed             Supine serratus punches r                         Serratus wall slides             Ball on wall             Upper trap overhead shrug             Wall slides w/ lift off                          Ther Ex             Sidelying shoulder ER  r            Prone low trap retraction r                         UBE             Shoulder ER MRE             Standing shoulder IR janice             Standing straight arm pull downs                           Ther Activity                                       Gait Training                                       Modalities

## 2021-06-22 ENCOUNTER — OFFICE VISIT (OUTPATIENT)
Dept: PHYSICAL THERAPY | Facility: CLINIC | Age: 61
End: 2021-06-22
Payer: COMMERCIAL

## 2021-06-22 DIAGNOSIS — M25.511 ACUTE PAIN OF RIGHT SHOULDER: Primary | ICD-10-CM

## 2021-06-22 PROCEDURE — 97110 THERAPEUTIC EXERCISES: CPT | Performed by: PHYSICAL THERAPIST

## 2021-06-22 PROCEDURE — 97112 NEUROMUSCULAR REEDUCATION: CPT | Performed by: PHYSICAL THERAPIST

## 2021-06-22 PROCEDURE — 97140 MANUAL THERAPY 1/> REGIONS: CPT | Performed by: PHYSICAL THERAPIST

## 2021-06-22 NOTE — PROGRESS NOTES
Daily Note     Today's date: 2021  Patient name: Angel Rojo  :   MRN: [id number]  Referring provider: Willie Lea PA-C  Dx:   Encounter Diagnosis     ICD-10-CM    1  Acute pain of right shoulder  M25 511                   Subjective: Patient notes feeling better in her R shoulder  Objective: See treatment diary below      Assessment: Tolerated treatment well  Patient would benefit from continued PT  Patient progressing well  Plan: Progress treatment as tolerated         Precautions: melanoma      Manuals            R cervical mx STM As needed  [address]           Shoulder PROM   [address]                                     Neuro Re-Ed             Supine serratus punches r 15x 5" holds                         Serratus wall slides             Ball on wall  20x            Upper trap overhead shrug             Wall slides w/ lift off  15x w/ lift off                         Ther Ex             Sidelying shoulder ER  r 3x10            Prone low trap retraction r 2x12                         UBE  4 min            Shoulder ER MRE             Standing shoulder IR janice             Standing straight arm pull downs   2x12                         Ther Activity                                       Gait Training                                       Modalities

## 2021-06-29 ENCOUNTER — APPOINTMENT (OUTPATIENT)
Dept: PHYSICAL THERAPY | Facility: CLINIC | Age: 61
End: 2021-06-29
Payer: COMMERCIAL

## 2023-03-26 PROBLEM — Z86.0100 PERSONAL HISTORY OF COLONIC POLYPS: Status: ACTIVE | Noted: 2023-03-26

## 2023-03-26 PROBLEM — D13.1 FUNDIC GLAND POLYPS OF STOMACH, BENIGN: Status: ACTIVE | Noted: 2023-03-26

## 2025-04-16 PROBLEM — R10.11 RIGHT UPPER QUADRANT PAIN: Status: ACTIVE | Noted: 2025-04-16

## 2025-04-28 ENCOUNTER — HOSPITAL ENCOUNTER (OUTPATIENT)
Dept: ULTRASOUND IMAGING | Facility: MEDICAL CENTER | Age: 65
Discharge: HOME | End: 2025-04-28
Attending: INTERNAL MEDICINE
Payer: COMMERCIAL

## 2025-04-28 DIAGNOSIS — R10.11 RIGHT UPPER QUADRANT PAIN: ICD-10-CM

## 2025-04-28 PROCEDURE — 76705 ECHO EXAM OF ABDOMEN: CPT

## (undated) DEVICE — SUT MONOCRYL 4-0 PS-2 18 IN Y496G

## (undated) DEVICE — PLASTIC ADHESIVE BANDAGE: Brand: CURITY

## (undated) DEVICE — ADHESIVE SKN CLSR HISTOACRYL FLEX 0.5ML LF

## (undated) DEVICE — SUT STRATAFIX SPIRAL MONOCRYL PLUS  3-0 PS-2 30 CM SXMP1B106

## (undated) DEVICE — SCD SEQUENTIAL COMPRESSION COMFORT SLEEVE MEDIUM KNEE LENGTH: Brand: KENDALL SCD

## (undated) DEVICE — SPONGE STICK WITH PVP-I: Brand: KENDALL

## (undated) DEVICE — 3M™ STERI-STRIP™ REINFORCED ADHESIVE SKIN CLOSURES, R1547, 1/2 IN X 4 IN (12 MM X 100 MM), 6 STRIPS/ENVELOPE: Brand: 3M™ STERI-STRIP™

## (undated) DEVICE — PAD GROUNDING ADULT

## (undated) DEVICE — CHLORAPREP HI-LITE 26ML ORANGE

## (undated) DEVICE — GLOVE SRG BIOGEL 7.5

## (undated) DEVICE — MINOR PROCEDURE DRAPE: Brand: CONVERTORS

## (undated) DEVICE — UNDYED MONOFILAMENT (POLYDIOXANONE), ABSORBABLE SURGICAL SUTURE: Brand: PDS

## (undated) DEVICE — SKIN MARKER DUAL TIP WITH RULER CAP, FLEXIBLE RULER AND LABELS: Brand: DEVON

## (undated) DEVICE — INTENDED FOR TISSUE SEPARATION, AND OTHER PROCEDURES THAT REQUIRE A SHARP SURGICAL BLADE TO PUNCTURE OR CUT.: Brand: BARD-PARKER ® CARBON RIB-BACK BLADES

## (undated) DEVICE — SPONGE 4 X 4 XRAY 16 PLY STRL LF RFD

## (undated) DEVICE — GLOVE INDICATOR PI UNDERGLOVE SZ 7.5 BLUE

## (undated) DEVICE — STRL UNIVERSAL MINOR GENERAL: Brand: CARDINAL HEALTH

## (undated) DEVICE — ELECTRODE BLADE MOD E-Z CLEAN 2.5IN 6.4CM -0012M

## (undated) DEVICE — REM POLYHESIVE ADULT PATIENT RETURN ELECTRODE: Brand: VALLEYLAB

## (undated) DEVICE — STERILE POLYISOPRENE POWDER-FREE SURGICAL GLOVES: Brand: PROTEXIS

## (undated) DEVICE — BASIC PACK: Brand: CONVERTORS

## (undated) DEVICE — NEEDLE 25G X 1 1/2

## (undated) DEVICE — LIGHT GLOVE GREEN

## (undated) DEVICE — PREP PAD BNS: Brand: CONVERTORS

## (undated) DEVICE — INTENDED FOR TISSUE SEPARATION, AND OTHER PROCEDURES THAT REQUIRE A SHARP SURGICAL BLADE TO PUNCTURE OR CUT.: Brand: BARD-PARKER ® SAFETYLOCK CARBON RIB-BACK BLADES

## (undated) DEVICE — 2000CC GUARDIAN II: Brand: GUARDIAN

## (undated) DEVICE — TUBING SUCTION 5MM X 12 FT

## (undated) DEVICE — PLUMEPEN PRO 10FT

## (undated) DEVICE — NEEDLE BLUNT 18 G X 1 1/2IN

## (undated) DEVICE — 3M™ STERI-STRIP™ COMPOUND BENZOIN TINCTURE 40 BAGS/CARTON 4 CARTONS/CASE C1544: Brand: 3M™ STERI-STRIP™

## (undated) DEVICE — 1820 FOAM BLOCK NEEDLE COUNTER: Brand: DEVON

## (undated) DEVICE — VIAL DECANTER

## (undated) DEVICE — PENCIL ELECTROSURG E-Z CLEAN -0035H

## (undated) DEVICE — SYRINGE 10ML LL CONTROL TOP

## (undated) DEVICE — ADHESIVE SKIN HIGH VISCOSITY EXOFIN 1ML

## (undated) DEVICE — STERILE POLYISOPRENE POWDER-FREE SURGICAL GLOVES WITH EMOLLIENT COATING: Brand: PROTEXIS